# Patient Record
Sex: MALE | Race: ASIAN | NOT HISPANIC OR LATINO | URBAN - METROPOLITAN AREA
[De-identification: names, ages, dates, MRNs, and addresses within clinical notes are randomized per-mention and may not be internally consistent; named-entity substitution may affect disease eponyms.]

---

## 2017-07-01 ENCOUNTER — OUTPATIENT (OUTPATIENT)
Dept: OUTPATIENT SERVICES | Facility: HOSPITAL | Age: 67
LOS: 1 days | End: 2017-07-01
Payer: MEDICAID

## 2017-07-20 DIAGNOSIS — R69 ILLNESS, UNSPECIFIED: ICD-10-CM

## 2017-09-01 PROCEDURE — G9001: CPT

## 2022-02-02 ENCOUNTER — NON-APPOINTMENT (OUTPATIENT)
Age: 72
End: 2022-02-02

## 2022-02-02 ENCOUNTER — APPOINTMENT (OUTPATIENT)
Dept: TRANSPLANT | Facility: CLINIC | Age: 72
End: 2022-02-02
Payer: COMMERCIAL

## 2022-02-02 ENCOUNTER — APPOINTMENT (OUTPATIENT)
Dept: NEPHROLOGY | Facility: CLINIC | Age: 72
End: 2022-02-02
Payer: COMMERCIAL

## 2022-02-02 ENCOUNTER — APPOINTMENT (OUTPATIENT)
Dept: TRANSPLANT | Facility: CLINIC | Age: 72
End: 2022-02-02

## 2022-02-02 VITALS
OXYGEN SATURATION: 98 % | BODY MASS INDEX: 32.99 KG/M2 | TEMPERATURE: 97.5 F | SYSTOLIC BLOOD PRESSURE: 171 MMHG | RESPIRATION RATE: 14 BRPM | HEART RATE: 58 BPM | WEIGHT: 217 LBS | DIASTOLIC BLOOD PRESSURE: 74 MMHG

## 2022-02-02 VITALS
RESPIRATION RATE: 14 BRPM | DIASTOLIC BLOOD PRESSURE: 74 MMHG | HEART RATE: 58 BPM | OXYGEN SATURATION: 98 % | HEIGHT: 68 IN | SYSTOLIC BLOOD PRESSURE: 171 MMHG | WEIGHT: 217 LBS | BODY MASS INDEX: 32.89 KG/M2 | TEMPERATURE: 97.5 F

## 2022-02-02 DIAGNOSIS — Z83.3 FAMILY HISTORY OF DIABETES MELLITUS: ICD-10-CM

## 2022-02-02 PROCEDURE — 99072 ADDL SUPL MATRL&STAF TM PHE: CPT

## 2022-02-02 PROCEDURE — 99205 OFFICE O/P NEW HI 60 MIN: CPT

## 2022-02-02 NOTE — PHYSICAL EXAM
[Well Developed] : well developed [Well Nourished] : well nourished [Normocephalic] : normocephalic [Atraumatic] : atraumatic [Breathing Comfortably on RA] : breathing comfortably on room air

## 2022-02-02 NOTE — ASSESSMENT
[Fair candidate] : a fair candidate. We should proceed with our protocol for evaluation for kidney transplantation. [FreeTextEntry1] : Today, we discussed the risks and benefits of kidney transplantation in depth.  Surgical risks included but were not limited to bleeding, infection, graft thrombosis, ureteral and vascular strictures, delayed graft function, urine leak, and potential need for re operation postoperatively.  We also discussed the risks of postoperative immunosuppression including but not limited to increased risk of infection, cancer, weight gain, new onset or worsening of diabetes or hypertension on a temporary or permanent basis, water retention, back pain, constipation, diarrhea, dizziness, headache, joint pain, loss of appetite, nausea, stomach pain or upset, trouble sleeping, vomiting, and/or mental or mood changes were fully disclosed.  He understands these risks, and further understands that transplantation requires a life-long commitment, and is willing to proceed with kidney transplantation. He also understands that there is a risk of recurrent primary kidney disease in the transplanted organ.\par \par We discussed the differences in quality of life and patient survival between remaining on dialysis versus receiving a kidney transplant.  We also discussed increased benefits of receiving a live donor kidney transplant versus a  donor kidney transplant.\par \par We also discussed the risks and benefits of receiving a kidney from a donor with a Kidney Donor Profile Index (KDPI) score greater than 85% including a greater risk of delayed graft function, increased need for dialysis within the first 2-3 weeks after transplant, and statistically lower graft survival at 3 years. .\par Patient consent is in the chart.  Patient is aware that they may withdraw their consent for transplantation at any time.\par \par We discussed the one-year observed and expected patient and graft survival rates in comparison to the national one-year averages as described in the evaluation consent forms.\par \par Plan:\par \par Prior to consideration for transplant, the patient needs to have the following studies completed:\par \par - Labs and studies, as per transplant protocol, ordered at the time of evaluation\par - Cardiac clearance \par - Colonoscopy\par - CT A/P\par - CXR\par \par After the patient has completed the full kidney transplant evaluation and work up, he will be discussed in our Multi-disciplinary Kidney Transplant Recipient Selection Meeting. At that time we will determine the patient's suitability for kidney transplantation. \par \par \par \par

## 2022-02-02 NOTE — ASSESSMENT
[FreeTextEntry1] : .Mr. SARABIA 71 year He is evaluated for kidney transplantation.\par Pre transplant/ESRD: Patient will benefit from renal allotransplantation he is an acceptable/ moderate risk candidate, diabetes, CAD \par Medical risks: Cardiovascular, cancer screening.\par Diabetes Mellitus: Discussed implications. Continue follow up with primary physicians\par Hypertension: Discussed implications. Continue follow up with primary physicians.\par Cardiac risk:  will get further evaluation; echo, stress test; Reviewed cardiovascular risk reduction strategies\par Cancer screening: PSA .  Colon diaz screening. No known h/o neoplastic disease, chest CT (former heavy smoker)\par ID: Serology for acute and chronic viral infections. Screening for latent TB.- update\par Imaging: Renal/abdominal /chest /Iliac  imaging \par Consults: Nutrition, social work, cardiology, Transplant surgery.\par Reviewed factors affecting survival and morbidity while on wait list and reviewed doris- operative and long-term risk factors affecting outcome in kidney transplantation.\par Details of transplant surgery, immunosuppression and its complications and benefits of live donor transplantation as well as variability in wait times across regions and multiple listing were discussed. KDPI >85% and PHS high risk criteria donors were discussed. Discussed factors affecting morbidity and mortality while on hemodialysis.\par Current outcome for [facility] kidney transplant program and SRTR data were discussed. He has informative educational video and power point presentation regarding details of kidney transplantation at this center.\par Patient has potential live donor (being explored) at present. \par Will proceed with completing/ updating work up and listing for transplant/ live donor transplant once work up is reviewed and found to be ok.\par Patient will continue to follow up with Dr. Navarrete for renal function preservation.\par

## 2022-02-02 NOTE — HISTORY OF PRESENT ILLNESS
[FreeTextEntry1] : 71  years old male, born in  [facility] , living in  for 25 years\par Patient has known CKD (), preemptive.He is  on follow up with  is here for pre kidney transplant evaluation. \par He is a preemptive candidate.\par He has known DM () On insulin (age 49); HTN (). H/o Hyperlipidemia/ Gout\par H/o CAD, stent placed in 2021, Dr. Greta Coello at [facility].\par No known h/o kidney stone. He has Prostatism on Tamsulosin.\par No hematuria/Transfusions\par Nocturia: occasional\par No h/o Sleep apnea. Has h/o Thyroid disease.\par He is on Plavix. No known h/o tuberculosis or hepatitis.\par No h/o NSAID use.\par No major weight loss/weight loss surgeries\par Has h/o Pneumonia in , was hospitalized. No h/o  UTI.\par No known h/o active CVA/PVD/DVT/neoplasia/active infections/bleeding/open wounds/falls/seizures/psych issues/COVID.\par COVID vaccination: Recd vaccination\par Reports no major allergies. \par Most recent hospitalization/for:\par Past surgeries:\par No history of kidney/ bladder/ prostate surgery.\par Former smoker. Quit in 2016. 20 cigarettes/day for 57 years\par Family: \par Lives with wife, Jean-Claude Treviño. Wife is Clarence Treviño\par Parents are - Siblings- Has one brother and sister\par Children: 3 children, 2 in [place] and one in [place]. Healthy. \par No family history of kidney disease. Mother has h/o DM, Brother and sister diabetic\par Independent for ADL\par Able to walk ten blocks, can climb stairs with difficulty.\par Assist devices: cane\par Driving: No\par ROS: Has h/o shortness of breath on exertion. \par Vision:has glaucoma\par Hearing:Ok\par Functional/employment status:retired from [employer] in 2016\par Dialysis history: Preemptive\par Kidney Biopsy:None\par Potential Live donors: Daughter has expressed interest but he is not willing at present.\par \par Prior Studies:\par Cardiology:Nery Coello\par Cancer Screen:Has had colonoscopy 2015\par Consultants:Nery Coello\par Primary MD:Dr. Dick Reece\par Nephrologist:Dr. Navarrete\par Patient's brother in law is a nephrologist in [facility]- Dr. Sage\par \par Allergies: Sulfa\par \par Medications:\par \par Novolog\par Basaglar\par Drzolamide\par Amlodipine 5\par Losartan/HCTZ\par Rosuvastatin\par Tamsulosin\par Levothyroxine\par Aspirin\par Clopidogrel\par

## 2022-02-02 NOTE — HISTORY OF PRESENT ILLNESS
[de-identified] : 72yo M \par \par [id number].  On Insulin since 2000. \par \par Discovered CKD 2000.   Preemptive candidate. No access constructed. \par \par Cardiologist (Dr. Greta Coello) for SOB.  Had failed stress test, led to dx of CAD and stent 11/21 at [facility].  On ASA/Plavix.   \par \par No recent hospitalizations.  Had COVID vax.    Uses cane for balance. \par \par PMHx as above\par PSHx:   denies\par \par SocHx: lives with wife, daughter nearby in [place].  former smoker. \par FamHx: extensive family h/o DM

## 2022-02-02 NOTE — PHYSICAL EXAM
[General Appearance - Alert] : alert [General Appearance - In No Acute Distress] : in no acute distress [Sclera] : the sclera and conjunctiva were normal [PERRL With Normal Accommodation] : pupils were equal in size, round, and reactive to light [Extraocular Movements] : extraocular movements were intact [Outer Ear] : the ears and nose were normal in appearance [Oropharynx] : the oropharynx was normal [Neck Appearance] : the appearance of the neck was normal [Neck Cervical Mass (___cm)] : no neck mass was observed [Jugular Venous Distention Increased] : there was no jugular-venous distention [Thyroid Nodule] : there were no palpable thyroid nodules [Auscultation Breath Sounds / Voice Sounds] : lungs were clear to auscultation bilaterally [Heart Rate And Rhythm] : heart rate was normal and rhythm regular [Heart Sounds] : normal S1 and S2 [Heart Sounds Gallop] : no gallops [Heart Sounds Pericardial Friction Rub] : no pericardial rub [Full Pulse] : the pedal pulses are present [Bowel Sounds] : normal bowel sounds [Abdomen Soft] : soft [Abdomen Tenderness] : non-tender [Abdomen Mass (___ Cm)] : no abdominal mass palpated [Cervical Lymph Nodes Enlarged Posterior Bilaterally] : posterior cervical [Cervical Lymph Nodes Enlarged Anterior Bilaterally] : anterior cervical [Supraclavicular Lymph Nodes Enlarged Bilaterally] : supraclavicular [Axillary Lymph Nodes Enlarged Bilaterally] : axillary [Involuntary Movements] : no involuntary movements were seen [] : no rash [No Focal Deficits] : no focal deficits [Oriented To Time, Place, And Person] : oriented to person, place, and time [Impaired Insight] : insight and judgment were intact [Affect] : the affect was normal [FreeTextEntry1] : bilateral pedal edema

## 2022-02-03 ENCOUNTER — APPOINTMENT (OUTPATIENT)
Dept: TRANSPLANT | Facility: CLINIC | Age: 72
End: 2022-02-03
Payer: MEDICARE

## 2022-02-03 PROCEDURE — 81382T: CUSTOM

## 2022-02-03 PROCEDURE — 86832 HLA CLASS I HIGH DEFIN QUAL: CPT

## 2022-02-03 PROCEDURE — 99001T: CUSTOM

## 2022-02-03 PROCEDURE — 86833 HLA CLASS II HIGH DEFIN QUAL: CPT

## 2022-02-08 ENCOUNTER — NON-APPOINTMENT (OUTPATIENT)
Age: 72
End: 2022-02-08

## 2022-02-08 LAB
ABO + RH PNL BLD: NORMAL
ABO + RH PNL BLD: NORMAL
ALBUMIN SERPL ELPH-MCNC: 3.4 G/DL
ALP BLD-CCNC: 54 U/L
ALT SERPL-CCNC: 15 U/L
ANION GAP SERPL CALC-SCNC: 13 MMOL/L
APPEARANCE: CLEAR
AST SERPL-CCNC: 20 U/L
BACTERIA: NEGATIVE
BASOPHILS # BLD AUTO: 0.04 K/UL
BASOPHILS NFR BLD AUTO: 0.6 %
BILIRUB SERPL-MCNC: 0.2 MG/DL
BILIRUBIN URINE: NEGATIVE
BLOOD URINE: ABNORMAL
BUN SERPL-MCNC: 48 MG/DL
C PEPTIDE SERPL-MCNC: 1.7 NG/ML
CALCIUM SERPL-MCNC: 9.1 MG/DL
CHLORIDE SERPL-SCNC: 105 MMOL/L
CHOLEST SERPL-MCNC: 134 MG/DL
CMV DNA SPEC QL NAA+PROBE: NOT DETECTED IU/ML
CO2 SERPL-SCNC: 24 MMOL/L
COLOR: NORMAL
COVID-19 NUCLEOCAPSID  GAM ANTIBODY INTERPRETATION: NEGATIVE
COVID-19 SPIKE DOMAIN ANTIBODY INTERPRETATION: POSITIVE
CREAT SERPL-MCNC: 3.77 MG/DL
CREAT SPEC-SCNC: 68 MG/DL
CREAT/PROT UR: 8 RATIO
EBV DNA SERPL NAA+PROBE-ACNC: NOT DETECTED IU/ML
EBV EA AB SER IA-ACNC: <5 U/ML
EBV EA AB TITR SER IF: POSITIVE
EBV EA IGG SER QL IA: 240 U/ML
EBV EA IGG SER-ACNC: NEGATIVE
EBV EA IGM SER IA-ACNC: NEGATIVE
EBV PATRN SPEC IB-IMP: NORMAL
EBV VCA IGG SER IA-ACNC: 429 U/ML
EBV VCA IGM SER QL IA: <10 U/ML
EOSINOPHIL # BLD AUTO: 0.35 K/UL
EOSINOPHIL NFR BLD AUTO: 5.2 %
EPSTEIN-BARR VIRUS CAPSID ANTIGEN IGG: POSITIVE
ESTIMATED AVERAGE GLUCOSE: 157 MG/DL
GLUCOSE QUALITATIVE U: ABNORMAL
GLUCOSE SERPL-MCNC: 130 MG/DL
HAV IGM SER QL: NONREACTIVE
HBA1C MFR BLD HPLC: 7.1 %
HBV CORE IGG+IGM SER QL: NONREACTIVE
HBV SURFACE AB SER QL: NONREACTIVE
HBV SURFACE AB SERPL IA-ACNC: <3 MIU/ML
HBV SURFACE AG SER QL: NONREACTIVE
HCT VFR BLD CALC: 37.5 %
HCV AB SER QL: NONREACTIVE
HCV S/CO RATIO: 0.2 S/CO
HDLC SERPL-MCNC: 31 MG/DL
HEPATITIS A IGG ANTIBODY: REACTIVE
HGB BLD-MCNC: 11.3 G/DL
HIV1+2 AB SPEC QL IA.RAPID: NONREACTIVE
HSV 1+2 IGG SER IA-IMP: POSITIVE
HSV 1+2 IGG SER IA-IMP: POSITIVE
HSV1 IGG SER QL: >62.2 INDEX
HSV2 IGG SER QL: >23.6 INDEX
HYALINE CASTS: 3 /LPF
IMM GRANULOCYTES NFR BLD AUTO: 0.1 %
KETONES URINE: NEGATIVE
LDLC SERPL CALC-MCNC: 44 MG/DL
LEUKOCYTE ESTERASE URINE: NEGATIVE
LYMPHOCYTES # BLD AUTO: 1.86 K/UL
LYMPHOCYTES NFR BLD AUTO: 27.9 %
M TB IFN-G BLD-IMP: NEGATIVE
MAGNESIUM SERPL-MCNC: 2.3 MG/DL
MAN DIFF?: NORMAL
MCHC RBC-ENTMCNC: 26.3 PG
MCHC RBC-ENTMCNC: 30.1 GM/DL
MCV RBC AUTO: 87.4 FL
MICROSCOPIC-UA: NORMAL
MONOCYTES # BLD AUTO: 0.45 K/UL
MONOCYTES NFR BLD AUTO: 6.7 %
NEUTROPHILS # BLD AUTO: 3.96 K/UL
NEUTROPHILS NFR BLD AUTO: 59.5 %
NITRITE URINE: NEGATIVE
NONHDLC SERPL-MCNC: 102 MG/DL
PH URINE: 6
PHOSPHATE SERPL-MCNC: 5.2 MG/DL
PLATELET # BLD AUTO: 172 K/UL
POTASSIUM SERPL-SCNC: 4.1 MMOL/L
PROT SERPL-MCNC: 6.3 G/DL
PROT UR-MCNC: 545 MG/DL
PROTEIN URINE: ABNORMAL
PSA SERPL-MCNC: 2.58 NG/ML
QUANTIFERON TB PLUS MITOGEN MINUS NIL: 7.92 IU/ML
QUANTIFERON TB PLUS NIL: 0.05 IU/ML
QUANTIFERON TB PLUS TB1 MINUS NIL: -0.03 IU/ML
QUANTIFERON TB PLUS TB2 MINUS NIL: -0.01 IU/ML
RBC # BLD: 4.29 M/UL
RBC # FLD: 13.3 %
RED BLOOD CELLS URINE: 4 /HPF
RUBV IGG FLD-ACNC: 7.3 INDEX
RUBV IGG SER-IMP: POSITIVE
SARS-COV-2 AB SERPL IA-ACNC: >250 U/ML
SARS-COV-2 AB SERPL QL IA: 0.11 INDEX
SODIUM SERPL-SCNC: 142 MMOL/L
SPECIFIC GRAVITY URINE: 1.01
SQUAMOUS EPITHELIAL CELLS: 1 /HPF
T GONDII AB SER-IMP: POSITIVE
T GONDII IGG SER QL: 13.3 IU/ML
T PALLIDUM AB SER QL IA: NEGATIVE
TRIGL SERPL-MCNC: 294 MG/DL
UROBILINOGEN URINE: NORMAL
VZV AB TITR SER: POSITIVE
VZV IGG SER IF-ACNC: 656.8 INDEX
WBC # FLD AUTO: 6.67 K/UL
WHITE BLOOD CELLS URINE: 1 /HPF

## 2022-02-09 ENCOUNTER — NON-APPOINTMENT (OUTPATIENT)
Age: 72
End: 2022-02-09

## 2022-02-11 ENCOUNTER — APPOINTMENT (OUTPATIENT)
Dept: UROLOGY | Facility: CLINIC | Age: 72
End: 2022-02-11
Payer: MEDICARE

## 2022-02-11 VITALS
DIASTOLIC BLOOD PRESSURE: 78 MMHG | BODY MASS INDEX: 31.83 KG/M2 | HEIGHT: 68 IN | RESPIRATION RATE: 14 BRPM | SYSTOLIC BLOOD PRESSURE: 148 MMHG | HEART RATE: 58 BPM | WEIGHT: 210 LBS

## 2022-02-11 DIAGNOSIS — Z86.79 PERSONAL HISTORY OF OTHER DISEASES OF THE CIRCULATORY SYSTEM: ICD-10-CM

## 2022-02-11 PROCEDURE — 99203 OFFICE O/P NEW LOW 30 MIN: CPT

## 2022-02-11 NOTE — PHYSICAL EXAM
[General Appearance - Well Developed] : well developed [General Appearance - Well Nourished] : well nourished [Normal Appearance] : normal appearance [Well Groomed] : well groomed [General Appearance - In No Acute Distress] : no acute distress [Edema] : no peripheral edema [Respiration, Rhythm And Depth] : normal respiratory rhythm and effort [Exaggerated Use Of Accessory Muscles For Inspiration] : no accessory muscle use [Abdomen Soft] : soft [Abdomen Tenderness] : non-tender [Abdomen Hernia] : no hernia was discovered [Costovertebral Angle Tenderness] : no ~M costovertebral angle tenderness [Urethral Meatus] : meatus normal [Urinary Bladder Findings] : the bladder was normal on palpation [Scrotum] : the scrotum was normal [Epididymis] : the epididymides were normal [Testes Tenderness] : no tenderness of the testes [Testes Mass (___cm)] : there were no testicular masses [Prostate Enlargement] : the prostate was not enlarged [Prostate Tenderness] : the prostate was not tender [No Prostate Nodules] : no prostate nodules [Normal Station and Gait] : the gait and station were normal for the patient's age [] : no rash [No Focal Deficits] : no focal deficits [Oriented To Time, Place, And Person] : oriented to person, place, and time [Affect] : the affect was normal [Mood] : the mood was normal [Not Anxious] : not anxious

## 2022-02-11 NOTE — ASSESSMENT
[FreeTextEntry1] : Patient is a 72 yo M who presents for asymptomatic microhematuria.\par \par Discussed with pt the implications of hematuria and need to further evaluate to rule out potentially dangerous or malignant underlying etiologies.  Discussed with pt need for upper tract imaging and cystoscopy.\par He is scheduled for CT scan next week of abd/pelvis\par Repeat UA\par F/u for cystoscopy.

## 2022-02-11 NOTE — HISTORY OF PRESENT ILLNESS
[FreeTextEntry1] : Patient is a 72 yo M who presents for essentially asymptomatic microhematuria.\par \par He had routine screening for transplant evaluation.  UA showed 4 rbcs/hpf on micro.\par \par Denies dysuria or gross hematuria.  He has mild LUTS - nocturia x1.  He previously had some burning with urination and was started on flomax which he is taking and no further dysuria.\par \par No urologic hx - no kidney stones.\par No family hx of  urologic cancer.\par PSA 2.58.\par \par

## 2022-02-15 ENCOUNTER — APPOINTMENT (OUTPATIENT)
Dept: GASTROENTEROLOGY | Facility: CLINIC | Age: 72
End: 2022-02-15
Payer: MEDICARE

## 2022-02-15 VITALS — WEIGHT: 210 LBS | BODY MASS INDEX: 31.83 KG/M2 | HEIGHT: 68 IN

## 2022-02-15 DIAGNOSIS — E03.9 HYPOTHYROIDISM, UNSPECIFIED: ICD-10-CM

## 2022-02-15 DIAGNOSIS — I25.10 ATHEROSCLEROTIC HEART DISEASE OF NATIVE CORONARY ARTERY W/OUT ANGINA PECTORIS: ICD-10-CM

## 2022-02-15 DIAGNOSIS — Z86.39 PERSONAL HISTORY OF OTHER ENDOCRINE, NUTRITIONAL AND METABOLIC DISEASE: ICD-10-CM

## 2022-02-15 PROCEDURE — 99204 OFFICE O/P NEW MOD 45 MIN: CPT

## 2022-02-15 NOTE — PHYSICAL EXAM
[General Appearance - Alert] : alert [General Appearance - In No Acute Distress] : in no acute distress [Sclera] : the sclera and conjunctiva were normal [PERRL With Normal Accommodation] : pupils were equal in size, round, and reactive to light [Extraocular Movements] : extraocular movements were intact [Outer Ear] : the ears and nose were normal in appearance [Oropharynx] : the oropharynx was normal [Neck Appearance] : the appearance of the neck was normal [Neck Cervical Mass (___cm)] : no neck mass was observed [Thyroid Diffuse Enlargement] : the thyroid was not enlarged [Jugular Venous Distention Increased] : there was no jugular-venous distention [Thyroid Nodule] : there were no palpable thyroid nodules [Heart Rate And Rhythm] : heart rate was normal and rhythm regular [Heart Sounds] : normal S1 and S2 [Heart Sounds Gallop] : no gallops [Murmurs] : no murmurs [Heart Sounds Pericardial Friction Rub] : no pericardial rub [Bowel Sounds] : normal bowel sounds [Abdomen Soft] : soft [Abdomen Tenderness] : non-tender [Abdomen Mass (___ Cm)] : no abdominal mass palpated [No CVA Tenderness] : no ~M costovertebral angle tenderness [No Spinal Tenderness] : no spinal tenderness [Abnormal Walk] : normal gait [Nail Clubbing] : no clubbing  or cyanosis of the fingernails [Musculoskeletal - Swelling] : no joint swelling seen [Motor Tone] : muscle strength and tone were normal [Skin Color & Pigmentation] : normal skin color and pigmentation [Skin Turgor] : normal skin turgor [] : no rash [Deep Tendon Reflexes (DTR)] : deep tendon reflexes were 2+ and symmetric [Sensation] : the sensory exam was normal to light touch and pinprick [No Focal Deficits] : no focal deficits [Oriented To Time, Place, And Person] : oriented to person, place, and time [Impaired Insight] : insight and judgment were intact [Affect] : the affect was normal

## 2022-02-15 NOTE — HISTORY OF PRESENT ILLNESS
[FreeTextEntry1] : He has an asymptomatic 72-year-old male referred for a for a pre kidney transplant: colon evaluation.  His last colonoscopy was in 2014 which was normal.  He denies a family history of colon cancer

## 2022-02-15 NOTE — CONSULT LETTER
[Dear  ___] : Dear  [unfilled], [Consult Letter:] : I had the pleasure of evaluating your patient, [unfilled]. [( Thank you for referring [unfilled] for consultation for _____ )] : Thank you for referring [unfilled] for consultation for [unfilled] [Please see my note below.] : Please see my note below. [Consult Closing:] : Thank you very much for allowing me to participate in the care of this patient.  If you have any questions, please do not hesitate to contact me. [Sincerely,] : Sincerely, [FreeTextEntry3] : Oscar Larose MD\par \par Gastroenterology\par [facility]\par [facility]\par \par

## 2022-02-15 NOTE — ASSESSMENT
[FreeTextEntry1] : LESTER SARABIA was advised to undergo colonoscopy to which he agreed. The procedure will be performed in [facility] with the assistance of an anesthesiologist. The patient was given a Gavilyte preparation prescription and understood the \par procedure as it was explained to his. He was given a booklet distributed by the American Society of Gastrointestinal\par  Endoscopy explaining the procedure in detail and he understood the risks of the procedure not limited to infection, bleeding,\par perforation or non- diagnosis of colorectal cancer. He was advised that he could not drive home, if he chooses to\par  receive sedation.\par \par Further diagnostic and treatment recommendations will be based upon the procedure and any biopsies, if they are taken.\par \par Thank you for allowing me to participate in this patients health care.\par \par , Best personal regards -- Don\par \par He will stop Plavix 3 days before his colonoscopy but stay on\par Aspirin until and including the day  before the colonoscopy

## 2022-02-16 ENCOUNTER — OUTPATIENT (OUTPATIENT)
Dept: OUTPATIENT SERVICES | Facility: HOSPITAL | Age: 72
LOS: 1 days | End: 2022-02-16
Payer: COMMERCIAL

## 2022-02-16 ENCOUNTER — APPOINTMENT (OUTPATIENT)
Dept: CT IMAGING | Facility: IMAGING CENTER | Age: 72
End: 2022-02-16
Payer: COMMERCIAL

## 2022-02-16 DIAGNOSIS — Z01.818 ENCOUNTER FOR OTHER PREPROCEDURAL EXAMINATION: ICD-10-CM

## 2022-02-16 PROCEDURE — 74176 CT ABD & PELVIS W/O CONTRAST: CPT

## 2022-02-16 PROCEDURE — 74176 CT ABD & PELVIS W/O CONTRAST: CPT | Mod: 26

## 2022-02-16 PROCEDURE — 71250 CT THORAX DX C-: CPT

## 2022-02-16 PROCEDURE — 71250 CT THORAX DX C-: CPT | Mod: 26

## 2022-02-23 ENCOUNTER — NON-APPOINTMENT (OUTPATIENT)
Age: 72
End: 2022-02-23

## 2022-02-23 ENCOUNTER — APPOINTMENT (OUTPATIENT)
Age: 72
End: 2022-02-23

## 2022-02-23 LAB
APPEARANCE: CLEAR
BACTERIA: NEGATIVE
BILIRUBIN URINE: NEGATIVE
BLOOD URINE: NORMAL
COLOR: COLORLESS
GLUCOSE QUALITATIVE U: ABNORMAL
HYALINE CASTS: 1 /LPF
KETONES URINE: NEGATIVE
LEUKOCYTE ESTERASE URINE: NEGATIVE
MICROSCOPIC-UA: NORMAL
NITRITE URINE: NEGATIVE
PH URINE: 6
PROTEIN URINE: ABNORMAL
RED BLOOD CELLS URINE: 2 /HPF
SPECIFIC GRAVITY URINE: 1.01
SQUAMOUS EPITHELIAL CELLS: 1 /HPF
UROBILINOGEN URINE: NORMAL
WHITE BLOOD CELLS URINE: 2 /HPF

## 2022-03-08 ENCOUNTER — APPOINTMENT (OUTPATIENT)
Dept: UROLOGY | Facility: CLINIC | Age: 72
End: 2022-03-08
Payer: MEDICARE

## 2022-03-08 ENCOUNTER — OUTPATIENT (OUTPATIENT)
Dept: OUTPATIENT SERVICES | Facility: HOSPITAL | Age: 72
LOS: 1 days | End: 2022-03-08
Payer: COMMERCIAL

## 2022-03-08 DIAGNOSIS — R31.29 OTHER MICROSCOPIC HEMATURIA: ICD-10-CM

## 2022-03-08 DIAGNOSIS — N13.8 BENIGN PROSTATIC HYPERPLASIA WITH LOWER URINARY TRACT SYMPMS: ICD-10-CM

## 2022-03-08 DIAGNOSIS — N40.1 BENIGN PROSTATIC HYPERPLASIA WITH LOWER URINARY TRACT SYMPMS: ICD-10-CM

## 2022-03-08 DIAGNOSIS — R35.0 FREQUENCY OF MICTURITION: ICD-10-CM

## 2022-03-08 PROCEDURE — 52000 CYSTOURETHROSCOPY: CPT

## 2022-03-09 DIAGNOSIS — N40.1 BENIGN PROSTATIC HYPERPLASIA WITH LOWER URINARY TRACT SYMPTOMS: ICD-10-CM

## 2022-03-09 DIAGNOSIS — R31.29 OTHER MICROSCOPIC HEMATURIA: ICD-10-CM

## 2022-03-10 RX ORDER — FINASTERIDE 5 MG/1
5 TABLET, FILM COATED ORAL DAILY
Qty: 90 | Refills: 1 | Status: ACTIVE | COMMUNITY
Start: 2022-03-08 | End: 1900-01-01

## 2022-03-23 ENCOUNTER — NON-APPOINTMENT (OUTPATIENT)
Age: 72
End: 2022-03-23

## 2022-03-24 RX ORDER — ASPIRIN 325 MG/1
TABLET, FILM COATED ORAL
Refills: 0 | Status: DISCONTINUED | COMMUNITY
End: 2022-03-24

## 2022-03-24 RX ORDER — DIAZEPAM 5 MG/1
5 TABLET ORAL DAILY
Refills: 0 | Status: ACTIVE | COMMUNITY

## 2022-03-24 RX ORDER — ROSUVASTATIN CALCIUM 10 MG/1
10 TABLET, FILM COATED ORAL
Refills: 0 | Status: ACTIVE | COMMUNITY

## 2022-03-24 RX ORDER — INSULIN ASPART 100 [IU]/ML
INJECTION, SOLUTION INTRAVENOUS; SUBCUTANEOUS 3 TIMES DAILY
Refills: 0 | Status: ACTIVE | COMMUNITY

## 2022-03-24 RX ORDER — LATANOPROST/PF 0.005 %
0.01 DROPS OPHTHALMIC (EYE)
Refills: 0 | Status: ACTIVE | COMMUNITY

## 2022-03-24 RX ORDER — ISOSORBIDE MONONITRATE 30 MG/1
30 TABLET, EXTENDED RELEASE ORAL DAILY
Refills: 0 | Status: ACTIVE | COMMUNITY

## 2022-03-24 RX ORDER — ERGOCALCIFEROL 1.25 MG/1
1.25 MG CAPSULE, LIQUID FILLED ORAL
Refills: 0 | Status: ACTIVE | COMMUNITY

## 2022-03-24 RX ORDER — TAMSULOSIN HYDROCHLORIDE 0.4 MG/1
0.4 CAPSULE ORAL DAILY
Refills: 0 | Status: ACTIVE | COMMUNITY

## 2022-03-24 RX ORDER — INSULIN GLARGINE 100 [IU]/ML
INJECTION, SOLUTION SUBCUTANEOUS DAILY
Refills: 0 | Status: ACTIVE | COMMUNITY

## 2022-03-24 RX ORDER — DORZOLAMIDE HYDROCHLORIDE 20 MG/ML
SOLUTION OPHTHALMIC
Refills: 0 | Status: DISCONTINUED | COMMUNITY
End: 2022-03-24

## 2022-03-24 RX ORDER — DORZOLAMIDE HYDROCHLORIDE AND TIMOLOL MALEATE 20; 5 MG/ML; MG/ML
SOLUTION/ DROPS OPHTHALMIC
Refills: 0 | Status: ACTIVE | COMMUNITY

## 2022-03-24 RX ORDER — LOSARTAN POTASSIUM 100 MG/1
TABLET, FILM COATED ORAL
Refills: 0 | Status: DISCONTINUED | COMMUNITY
End: 2022-03-24

## 2022-03-24 RX ORDER — GABAPENTIN 100 MG/1
100 CAPSULE ORAL 3 TIMES DAILY
Refills: 0 | Status: ACTIVE | COMMUNITY

## 2022-03-31 ENCOUNTER — APPOINTMENT (OUTPATIENT)
Dept: CARDIOLOGY | Facility: CLINIC | Age: 72
End: 2022-03-31
Payer: COMMERCIAL

## 2022-03-31 ENCOUNTER — NON-APPOINTMENT (OUTPATIENT)
Age: 72
End: 2022-03-31

## 2022-03-31 VITALS
OXYGEN SATURATION: 96 % | BODY MASS INDEX: 31.83 KG/M2 | HEART RATE: 58 BPM | DIASTOLIC BLOOD PRESSURE: 75 MMHG | WEIGHT: 210 LBS | HEIGHT: 68 IN | SYSTOLIC BLOOD PRESSURE: 145 MMHG

## 2022-03-31 DIAGNOSIS — Z95.5 PRESENCE OF CORONARY ANGIOPLASTY IMPLANT AND GRAFT: ICD-10-CM

## 2022-03-31 PROCEDURE — 99072 ADDL SUPL MATRL&STAF TM PHE: CPT

## 2022-03-31 PROCEDURE — 99203 OFFICE O/P NEW LOW 30 MIN: CPT

## 2022-03-31 PROCEDURE — 93000 ELECTROCARDIOGRAM COMPLETE: CPT

## 2022-04-01 ENCOUNTER — APPOINTMENT (OUTPATIENT)
Dept: PULMONOLOGY | Facility: CLINIC | Age: 72
End: 2022-04-01
Payer: MEDICARE

## 2022-04-01 VITALS
BODY MASS INDEX: 33.04 KG/M2 | TEMPERATURE: 98.2 F | DIASTOLIC BLOOD PRESSURE: 76 MMHG | WEIGHT: 218 LBS | HEART RATE: 57 BPM | OXYGEN SATURATION: 96 % | HEIGHT: 68 IN | SYSTOLIC BLOOD PRESSURE: 140 MMHG

## 2022-04-01 DIAGNOSIS — Z87.891 PERSONAL HISTORY OF NICOTINE DEPENDENCE: ICD-10-CM

## 2022-04-01 DIAGNOSIS — R06.00 DYSPNEA, UNSPECIFIED: ICD-10-CM

## 2022-04-01 DIAGNOSIS — R91.8 OTHER NONSPECIFIC ABNORMAL FINDING OF LUNG FIELD: ICD-10-CM

## 2022-04-01 PROCEDURE — 94726 PLETHYSMOGRAPHY LUNG VOLUMES: CPT

## 2022-04-01 PROCEDURE — 94010 BREATHING CAPACITY TEST: CPT

## 2022-04-01 PROCEDURE — G0296 VISIT TO DETERM LDCT ELIG: CPT

## 2022-04-01 PROCEDURE — ZZZZZ: CPT

## 2022-04-01 PROCEDURE — 99204 OFFICE O/P NEW MOD 45 MIN: CPT | Mod: 25

## 2022-04-01 PROCEDURE — 94729 DIFFUSING CAPACITY: CPT

## 2022-04-01 RX ORDER — LOSARTAN POTASSIUM AND HYDROCHLOROTHIAZIDE 25; 100 MG/1; MG/1
100-25 TABLET ORAL DAILY
Refills: 0 | Status: COMPLETED | COMMUNITY
End: 2022-04-01

## 2022-04-01 RX ORDER — UMECLIDINIUM BROMIDE AND VILANTEROL TRIFENATATE 62.5; 25 UG/1; UG/1
62.5-25 POWDER RESPIRATORY (INHALATION)
Qty: 2 | Refills: 3 | Status: ACTIVE | COMMUNITY
Start: 2022-04-01 | End: 1900-01-01

## 2022-04-01 RX ORDER — HYDRALAZINE HYDROCHLORIDE 25 MG/1
25 TABLET ORAL
Refills: 0 | Status: ACTIVE | COMMUNITY

## 2022-04-01 RX ORDER — AMLODIPINE BESYLATE 10 MG/1
10 TABLET ORAL DAILY
Refills: 0 | Status: COMPLETED | COMMUNITY
End: 2022-04-01

## 2022-04-01 NOTE — PHYSICAL EXAM
[No Acute Distress] : no acute distress [Normal Oropharynx] : normal oropharynx [Normal Appearance] : normal appearance [No Neck Mass] : no neck mass [Normal Rate/Rhythm] : normal rate/rhythm [Normal S1, S2] : normal s1, s2 [No Murmurs] : no murmurs [No Resp Distress] : no resp distress [Clear to Auscultation Bilaterally] : clear to auscultation bilaterally [No Abnormalities] : no abnormalities [Benign] : benign [Normal Gait] : normal gait [No Clubbing] : no clubbing [No Cyanosis] : no cyanosis [FROM] : FROM [Normal Color/ Pigmentation] : normal color/ pigmentation [No Focal Deficits] : no focal deficits [Oriented x3] : oriented x3 [Normal Affect] : normal affect [Well Nourished] : well nourished [Well Developed] : well developed [Supple] : supple [No JVD] : no jvd [Normal Pulses] : normal pulses [No Acc Muscle Use] : no acc muscle use [Not Tender] : not tender [Soft] : soft [Cranial Nerves Intact] : cranial nerves intact [No Motor Deficits] : no motor deficits [TextBox_2] : central obesity [TextBox_11] : crowded [TextBox_54] : 2+pitting edema bilaterally [TextBox_68] : no wheezing or rales

## 2022-04-01 NOTE — HISTORY OF PRESENT ILLNESS
[Former] : former [Never] : never [TextBox_4] : Mr. Landrum is a 71 year-old male with a history of CKD pending renal transplant, DM2 (insulin-dependent), HTN, HLD, CAD s/p PCI (Nov 2021), former heavy smoker, BPH, and gout who presents for evaluation of lung nodules identified on CT chest. \par \par CT Chest (Feb 2022) with few calcified 1-2 mm nodules and non-calcified nodules, largest measuring 5 mm in the RLL. Mild biapical pleural scarring. No pleural effusion or thoracic lymphadenopathy.\par \par He is a former smoker, 1 ppd for 50-55 years. He denies any dyspnea at rest or with regular exertion. He reports that exercise is limited due to knee pain, but also reports some dyspnea with activity. He has no chest pain, wheezing, or significant coughing. He reports leg edema that has improved after stopping amlodipine.\par \par QuantiFeron TB Gold Plus negative in February 2022.\par \par He is pending echo.\par \par PFTs (April 2022) difficult to interpret as he had difficulty with testing, but there is nonspecific pattern of reduced FEV1 & FVC with normal FEV1/FVC ratio, normal TLC, increased RV due to air trapping, and mildly reduced diffusing capacity. This can be seen in obesity and obstructive lung disease, among other conditions.\par \par Of note he has a history of pneumonia in 2005 that required hospitalization. \par \par Up to date with influenza and COVID-19 vaccinations. Reports history of pneumococcal vaccination about 5 years ago.

## 2022-04-01 NOTE — CONSULT LETTER
[Dear  ___] : Dear  [unfilled], [Consult Letter:] : I had the pleasure of evaluating your patient, [unfilled]. [( Thank you for referring [unfilled] for consultation for _____ )] : Thank you for referring [unfilled] for consultation for [unfilled] [Please see my note below.] : Please see my note below. [Consult Closing:] : Thank you very much for allowing me to participate in the care of this patient.  If you have any questions, please do not hesitate to contact me. [Sincerely,] : Sincerely, [DrAlvino  ___] : Dr. KITCHEN [FreeTextEntry2] : Dr. Kristian Wheeler MD\par Fax: [phone]\par \par Dr. Amado Ortiz MD\par Fax: [phone] [FreeTextEntry3] : Jose Carlos Solis MD\par Attending Physician in Pulmonary & Critical Care Medicine\par  of Medicine\par [employer] [facility]

## 2022-04-01 NOTE — ASSESSMENT
[FreeTextEntry1] : Mr. Landrum is a 71 year-old male with a history of CKD pending renal transplant, DM2 (insulin-dependent), HTN, HLD, CAD s/p PCI (Nov 2021), former heavy smoker, BPH, and gout who presents for pre-kidney transplant pulmonary evaluation. He was found on CT chest to have multiple subcentimeter lung nodules (largest 5 mm) with mild biapical pleural scarring and without pleural effusion or thoracic lymphadenopathy. He is a former heavy smoker. Denies any dyspnea at rest or with regular exertion, but he report dyspnea with exercise such as going up a flight of stairs. Exercise is also limited by his knee pain. He denies chest pain, wheezing, or significant coughing. He has leg edema that has improved after stopping amlodipine.\par \par 1. Multiple subcentimeter lung nodules:\par - Per the Fleischner Society guidelines, he should receive a repeat CT chest in 1 year to assess for stability, which he will require anyway for lung cancer screening\par - Does not require invasive diagnostic testing for the lung nodules at this time\par \par 2. Former smoker:\par - Former >50-pack-year smoker, quit in 2016\par - Continue smoking cessation\par - Needs yearly lung cancer screening CT chest until at least 2031, next due in Feb 2023\par - He participated in a shared-decision making session where benefits, limitations, and potential harms were discussed. He is eligible based on age, calculation of cigarette-smoking pack years, and years since quitting. The patient was informed of the importance of adherence to annual screening and the impact of comorbidities. He is asymptomatic (no signs or symptoms of lung cancer)\par \par 3. Dyspnea on exertion:\par - Likely multifactorial due to underlying hypertensive heart disease + CAD + deconditioning + former smoker\par - PFTs (April 2022) difficult to interpret as he had difficulty with testing, but there is nonspecific pattern of reduced FEV1 & FVC with normal FEV1/FVC ratio, normal TLC, increased RV due to air trapping, and mildly reduced diffusing capacity. This can be seen in obesity and obstructive lung disease, among other conditions\par - He is pending echocardiogram next week\par - Will initiate trial of Anoro Ellipta for possible underlying COPD that is contributing to his SIERRA\par \par 4. Pre-Kidney transplant evaluation:\par - QuantiFeron TB Gold Plus negative in February 2022\par - There is no contraindication from a pulmonary perspective to proceed with kidney transplant. Given his history of smoking with possible obstructive lung disease along with history of CAD and long-standing hypertension, he is at higher risk for post-op respiratory failure, although he is currently without hypoxemia and only has dyspnea with significant exertion. He should take his Anoro Ellipta on the morning of surgery. Can be administered Duonebs if develops wheezing or dyspnea. Please limit sedation to lowest dose required. Needs close cardiac and respiratory monitoring during and after procedure. Consider CPAP therapy post-procedure if he develops respiratory distress. No contraindications to proceeding with procedure from pulmonary perspective with above precautions \par \par 5. HCM:\par - Up to date with influenza and COVID-19 vaccinations\par - Reports history of pneumococcal vaccination about 5 years ago\par - Follow-up in 3-6 months or sooner prn

## 2022-04-20 ENCOUNTER — APPOINTMENT (OUTPATIENT)
Dept: CV DIAGNOSTICS | Facility: HOSPITAL | Age: 72
End: 2022-04-20
Payer: COMMERCIAL

## 2022-04-20 ENCOUNTER — OUTPATIENT (OUTPATIENT)
Dept: OUTPATIENT SERVICES | Facility: HOSPITAL | Age: 72
LOS: 1 days | End: 2022-04-20

## 2022-04-20 ENCOUNTER — APPOINTMENT (OUTPATIENT)
Dept: CV DIAGNOSITCS | Facility: HOSPITAL | Age: 72
End: 2022-04-20
Payer: COMMERCIAL

## 2022-04-20 DIAGNOSIS — I10 ESSENTIAL (PRIMARY) HYPERTENSION: ICD-10-CM

## 2022-04-20 DIAGNOSIS — Z01.818 ENCOUNTER FOR OTHER PREPROCEDURAL EXAMINATION: ICD-10-CM

## 2022-04-20 PROCEDURE — 93016 CV STRESS TEST SUPVJ ONLY: CPT | Mod: GC

## 2022-04-20 PROCEDURE — 93306 TTE W/DOPPLER COMPLETE: CPT | Mod: 26

## 2022-04-20 PROCEDURE — 93018 CV STRESS TEST I&R ONLY: CPT | Mod: GC

## 2022-04-20 PROCEDURE — 78452 HT MUSCLE IMAGE SPECT MULT: CPT | Mod: 26,MH

## 2022-04-21 NOTE — HISTORY OF PRESENT ILLNESS
[FreeTextEntry1] : Doing okay. Denies chest pain, shortness of breath or palpitations. Has some leg swelling.

## 2022-04-21 NOTE — PHYSICAL EXAM
[Well Developed] : well developed [No Acute Distress] : no acute distress [Well Nourished] : well nourished [Normal Conjunctiva] : normal conjunctiva [Normal Venous Pressure] : normal venous pressure [No Carotid Bruit] : no carotid bruit [Normal S1, S2] : normal S1, S2 [No Murmur] : no murmur [No Rub] : no rub [No Gallop] : no gallop [Good Air Entry] : good air entry [No Respiratory Distress] : no respiratory distress  [Soft] : abdomen soft [Non Tender] : non-tender [Normal Gait] : normal gait [No Cyanosis] : no cyanosis [No Rash] : no rash [No Skin Lesions] : no skin lesions [Moves all extremities] : moves all extremities [No Focal Deficits] : no focal deficits [Normal Speech] : normal speech [Alert and Oriented] : alert and oriented [de-identified] : bilateral LE 1+ pitting edema [de-identified] : inspiratory rales in left base

## 2022-04-21 NOTE — ADDENDUM
[FreeTextEntry1] : CARDIAC TESTING:\par 04/20/22 (regadenoson tetrofosmin) - no evidence of infarction or inducible ischemia, LVEF 70%\par 04/20/22 ECHO - TDS, MAC, normal LA, grossly normal LV systolic function, LVEF 65%\par \par ASSESSMENT/PLAN:\par At this time, patient is considered an acceptable risk from a cardiac standpoint. Follow up annually pre-transplant.

## 2022-04-21 NOTE — DISCUSSION/SUMMARY
[Coronary Artery Disease] : coronary artery disease [Stable] : stable [Hypertension] : hypertension [FreeTextEntry1] : \par Currently stable from a cardiovascular standpoint. Hypertensive today. Currently in mild volume overload. Stable CAD (prior stent at [facility]). No ischemic or CHF symptoms. Continue current medications. ECG completed today and reviewed. Will obtain records from [place] for review. Will schedule a pharmacologic nuclear stress test to rule out any significant CAD/ischemia. In addition, will schedule an echocardiogram to assess his cardiac structures and function. Pending the test results, I will make further recommendations.

## 2022-04-27 ENCOUNTER — APPOINTMENT (OUTPATIENT)
Dept: VASCULAR SURGERY | Facility: CLINIC | Age: 72
End: 2022-04-27
Payer: MEDICARE

## 2022-04-27 ENCOUNTER — NON-APPOINTMENT (OUTPATIENT)
Age: 72
End: 2022-04-27

## 2022-04-27 VITALS
SYSTOLIC BLOOD PRESSURE: 155 MMHG | HEART RATE: 68 BPM | TEMPERATURE: 97.2 F | DIASTOLIC BLOOD PRESSURE: 77 MMHG | BODY MASS INDEX: 34.1 KG/M2 | HEIGHT: 68 IN | WEIGHT: 225 LBS

## 2022-04-27 DIAGNOSIS — Z87.448 PERSONAL HISTORY OF OTHER DISEASES OF URINARY SYSTEM: ICD-10-CM

## 2022-04-27 PROCEDURE — 99204 OFFICE O/P NEW MOD 45 MIN: CPT

## 2022-04-27 PROCEDURE — 93985 DUP-SCAN HEMO COMPL BI STD: CPT

## 2022-04-27 RX ORDER — ASPIRIN 81 MG/1
81 TABLET ORAL
Refills: 0 | Status: ACTIVE | COMMUNITY

## 2022-04-27 NOTE — ASSESSMENT
[FreeTextEntry1] : Problem #1 Long term dialysis access creation\par - will schedule for LUE AVF creation\par \par Problem #2 CKD Stage V\par - Management as per nephrology

## 2022-04-27 NOTE — HISTORY OF PRESENT ILLNESS
[FreeTextEntry1] : 71 year old male with CKD Stage V referred for long term hemodialysis access creation\par \par Nephrologist: Dr. Ortiz\par \par Right hand dominant. No pacemaker or AICD.

## 2022-04-27 NOTE — PHYSICAL EXAM
[Respiratory Effort] : normal respiratory effort [Normal Rate and Rhythm] : normal rate and rhythm [2+] : left 2+ [Alert] : alert [Oriented to Person] : oriented to person [Oriented to Place] : oriented to place [Oriented to Time] : oriented to time [Calm] : calm [Ankle Swelling (On Exam)] : not present [Varicose Veins Of Lower Extremities] : not present [] : not present [Abdomen Tenderness] : ~T ~M No abdominal tenderness [Skin Ulcer] : no ulcer [de-identified] : appears stated age [de-identified] : normocephalic, atraumatic [de-identified] : supple

## 2022-05-09 ENCOUNTER — RESULT REVIEW (OUTPATIENT)
Age: 72
End: 2022-05-09

## 2022-05-10 ENCOUNTER — APPOINTMENT (OUTPATIENT)
Dept: GASTROENTEROLOGY | Facility: AMBULATORY SURGERY CENTER | Age: 72
End: 2022-05-10
Payer: MEDICARE

## 2022-05-10 DIAGNOSIS — Z12.11 ENCOUNTER FOR SCREENING FOR MALIGNANT NEOPLASM OF COLON: ICD-10-CM

## 2022-05-10 PROCEDURE — 45385 COLONOSCOPY W/LESION REMOVAL: CPT

## 2022-05-10 PROCEDURE — 45380 COLONOSCOPY AND BIOPSY: CPT | Mod: 59

## 2022-05-19 ENCOUNTER — OUTPATIENT (OUTPATIENT)
Dept: OUTPATIENT SERVICES | Facility: HOSPITAL | Age: 72
LOS: 1 days | End: 2022-05-19
Payer: COMMERCIAL

## 2022-05-19 VITALS
OXYGEN SATURATION: 96 % | DIASTOLIC BLOOD PRESSURE: 71 MMHG | SYSTOLIC BLOOD PRESSURE: 147 MMHG | RESPIRATION RATE: 18 BRPM | HEART RATE: 63 BPM | HEIGHT: 67.5 IN | TEMPERATURE: 98 F | WEIGHT: 203.93 LBS

## 2022-05-19 DIAGNOSIS — Z98.890 OTHER SPECIFIED POSTPROCEDURAL STATES: Chronic | ICD-10-CM

## 2022-05-19 DIAGNOSIS — N18.5 CHRONIC KIDNEY DISEASE, STAGE 5: ICD-10-CM

## 2022-05-19 DIAGNOSIS — Z01.818 ENCOUNTER FOR OTHER PREPROCEDURAL EXAMINATION: ICD-10-CM

## 2022-05-19 DIAGNOSIS — I10 ESSENTIAL (PRIMARY) HYPERTENSION: ICD-10-CM

## 2022-05-19 DIAGNOSIS — E11.9 TYPE 2 DIABETES MELLITUS WITHOUT COMPLICATIONS: ICD-10-CM

## 2022-05-19 DIAGNOSIS — G47.33 OBSTRUCTIVE SLEEP APNEA (ADULT) (PEDIATRIC): ICD-10-CM

## 2022-05-19 DIAGNOSIS — N18.6 END STAGE RENAL DISEASE: ICD-10-CM

## 2022-05-19 DIAGNOSIS — Z99.2 DEPENDENCE ON RENAL DIALYSIS: ICD-10-CM

## 2022-05-19 LAB
ANION GAP SERPL CALC-SCNC: 16 MMOL/L — SIGNIFICANT CHANGE UP (ref 5–17)
BUN SERPL-MCNC: 69 MG/DL — HIGH (ref 7–23)
CALCIUM SERPL-MCNC: 10.9 MG/DL — HIGH (ref 8.4–10.5)
CHLORIDE SERPL-SCNC: 95 MMOL/L — LOW (ref 96–108)
CO2 SERPL-SCNC: 26 MMOL/L — SIGNIFICANT CHANGE UP (ref 22–31)
CREAT SERPL-MCNC: 4 MG/DL — HIGH (ref 0.5–1.3)
EGFR: 15 ML/MIN/1.73M2 — LOW
GLUCOSE SERPL-MCNC: 371 MG/DL — HIGH (ref 70–99)
HCT VFR BLD CALC: 35.8 % — LOW (ref 39–50)
HGB BLD-MCNC: 11.3 G/DL — LOW (ref 13–17)
MCHC RBC-ENTMCNC: 26.9 PG — LOW (ref 27–34)
MCHC RBC-ENTMCNC: 31.6 GM/DL — LOW (ref 32–36)
MCV RBC AUTO: 85.2 FL — SIGNIFICANT CHANGE UP (ref 80–100)
NRBC # BLD: 0 /100 WBCS — SIGNIFICANT CHANGE UP (ref 0–0)
PLATELET # BLD AUTO: 158 K/UL — SIGNIFICANT CHANGE UP (ref 150–400)
POTASSIUM SERPL-MCNC: 4 MMOL/L — SIGNIFICANT CHANGE UP (ref 3.5–5.3)
POTASSIUM SERPL-SCNC: 4 MMOL/L — SIGNIFICANT CHANGE UP (ref 3.5–5.3)
RBC # BLD: 4.2 M/UL — SIGNIFICANT CHANGE UP (ref 4.2–5.8)
RBC # FLD: 12.6 % — SIGNIFICANT CHANGE UP (ref 10.3–14.5)
SODIUM SERPL-SCNC: 137 MMOL/L — SIGNIFICANT CHANGE UP (ref 135–145)
WBC # BLD: 6.88 K/UL — SIGNIFICANT CHANGE UP (ref 3.8–10.5)
WBC # FLD AUTO: 6.88 K/UL — SIGNIFICANT CHANGE UP (ref 3.8–10.5)

## 2022-05-19 PROCEDURE — 36415 COLL VENOUS BLD VENIPUNCTURE: CPT

## 2022-05-19 PROCEDURE — 83036 HEMOGLOBIN GLYCOSYLATED A1C: CPT

## 2022-05-19 PROCEDURE — G0463: CPT

## 2022-05-19 PROCEDURE — 85027 COMPLETE CBC AUTOMATED: CPT

## 2022-05-19 PROCEDURE — 80048 BASIC METABOLIC PNL TOTAL CA: CPT

## 2022-05-19 NOTE — H&P PST ADULT - NSICDXPASTMEDICALHX_GEN_ALL_CORE_FT
PAST MEDICAL HISTORY:  CAD (coronary artery disease) stent 2021    Gout     History of BPH     History of tobacco abuse     HTN (hypertension)     Hyperlipidemia     Lung nodules followed by Dr. Solis (pulm)    Type 2 diabetes mellitus      PAST MEDICAL HISTORY:  CAD (coronary artery disease) stent 10/2021    Gout     History of BPH     History of tobacco abuse     HTN (hypertension)     Hyperlipidemia     Lung nodules followed by Dr. Solis (pulm)    Stage 5 chronic kidney disease not on chronic dialysis     Type 2 diabetes mellitus      PAST MEDICAL HISTORY:  CAD (coronary artery disease) stent 10/2021    Gout     History of BPH     History of glaucoma     History of tobacco abuse     HTN (hypertension)     Hyperlipidemia     Lung nodules followed by Dr. Solis (pulm)    Stage 5 chronic kidney disease not on chronic dialysis     Type 2 diabetes mellitus

## 2022-05-19 NOTE — H&P PST ADULT - HISTORY OF PRESENT ILLNESS
71 year old male, with a history of CKD Stage V, presents for left AV fistula. Pt. with a history of progressively worsening CKD, currently being evaluated for renal transplant. No hemodialysis required as of yet.    Pre procedure COVID swabs scheduled for 5/30/22.

## 2022-05-19 NOTE — H&P PST ADULT - NSANTHOSAYNRD_GEN_A_CORE
No. LUCIUS screening performed.  STOP BANG Legend: 0-2 = LOW Risk; 3-4 = INTERMEDIATE Risk; 5-8 = HIGH Risk

## 2022-05-19 NOTE — H&P PST ADULT - NSICDXPASTSURGICALHX_GEN_ALL_CORE_FT
PAST SURGICAL HISTORY:  No significant past surgical history      PAST SURGICAL HISTORY:  History of colonoscopy

## 2022-05-19 NOTE — H&P PST ADULT - NS MD HP PULSE POSTERIOR
Pt seen and examined. Agree with above. Minimally displaced calvarial fracture with no ICH. baby at neurobaseline. Age appropriate behaviour and alertness. Per mom baby has been eating well, playful, active, reg bowel/bladder function. No neurosurgical intervention. Patient will f/u in office in 1 week. pt to return to ER for any concerns. right normal/left normal

## 2022-05-19 NOTE — H&P PST ADULT - PROBLEM SELECTOR PLAN 3
Stat fingerstick upon arrival to [facility]  As per PST policy, pt instructed to decrease his evening dose of basaglar insulin by 20% (Self administer 40 units night before procedure)  Pt. instructed NOT to take novolog insulin day of procedure

## 2022-05-20 ENCOUNTER — NON-APPOINTMENT (OUTPATIENT)
Age: 72
End: 2022-05-20

## 2022-05-20 LAB
A1C WITH ESTIMATED AVERAGE GLUCOSE RESULT: 8.7 % — HIGH (ref 4–5.6)
ESTIMATED AVERAGE GLUCOSE: 203 MG/DL — HIGH (ref 68–114)

## 2022-05-24 ENCOUNTER — APPOINTMENT (OUTPATIENT)
Dept: ENDOCRINOLOGY | Facility: CLINIC | Age: 72
End: 2022-05-24
Payer: MEDICARE

## 2022-05-24 VITALS
BODY MASS INDEX: 30.66 KG/M2 | HEART RATE: 57 BPM | DIASTOLIC BLOOD PRESSURE: 83 MMHG | SYSTOLIC BLOOD PRESSURE: 142 MMHG | OXYGEN SATURATION: 97 % | TEMPERATURE: 97.9 F | WEIGHT: 202.31 LBS | HEIGHT: 68 IN

## 2022-05-24 PROBLEM — E78.5 HYPERLIPIDEMIA, UNSPECIFIED: Chronic | Status: ACTIVE | Noted: 2022-05-19

## 2022-05-24 PROBLEM — N18.5 CHRONIC KIDNEY DISEASE, STAGE 5: Chronic | Status: ACTIVE | Noted: 2022-05-19

## 2022-05-24 PROBLEM — I25.10 ATHEROSCLEROTIC HEART DISEASE OF NATIVE CORONARY ARTERY WITHOUT ANGINA PECTORIS: Chronic | Status: ACTIVE | Noted: 2022-05-19

## 2022-05-24 PROBLEM — M10.9 GOUT, UNSPECIFIED: Chronic | Status: ACTIVE | Noted: 2022-05-19

## 2022-05-24 PROBLEM — Z87.438 PERSONAL HISTORY OF OTHER DISEASES OF MALE GENITAL ORGANS: Chronic | Status: ACTIVE | Noted: 2022-05-19

## 2022-05-24 PROBLEM — R91.8 OTHER NONSPECIFIC ABNORMAL FINDING OF LUNG FIELD: Chronic | Status: ACTIVE | Noted: 2022-05-19

## 2022-05-24 PROBLEM — Z87.891 PERSONAL HISTORY OF NICOTINE DEPENDENCE: Chronic | Status: ACTIVE | Noted: 2022-05-19

## 2022-05-24 PROBLEM — E11.9 TYPE 2 DIABETES MELLITUS WITHOUT COMPLICATIONS: Chronic | Status: ACTIVE | Noted: 2022-05-19

## 2022-05-24 LAB
GLUCOSE BLDC GLUCOMTR-MCNC: 209
HBA1C MFR BLD HPLC: 9.1

## 2022-05-24 PROCEDURE — 83036 HEMOGLOBIN GLYCOSYLATED A1C: CPT | Mod: QW

## 2022-05-24 PROCEDURE — 95251 CONT GLUC MNTR ANALYSIS I&R: CPT

## 2022-05-24 PROCEDURE — 99204 OFFICE O/P NEW MOD 45 MIN: CPT | Mod: 25

## 2022-05-25 LAB
T3FREE SERPL-MCNC: 2.28 PG/ML
T4 FREE SERPL-MCNC: 1 NG/DL
TSH SERPL-ACNC: 13.3 UIU/ML

## 2022-05-25 RX ORDER — INSULIN ASPART 100 [IU]/ML
100 INJECTION, SOLUTION INTRAVENOUS; SUBCUTANEOUS
Qty: 3 | Refills: 0 | Status: ACTIVE | COMMUNITY
Start: 2022-05-25 | End: 1900-01-01

## 2022-05-25 RX ORDER — INSULIN GLARGINE 100 [IU]/ML
100 INJECTION, SOLUTION SUBCUTANEOUS
Qty: 1 | Refills: 2 | Status: ACTIVE | COMMUNITY
Start: 2022-05-25 | End: 1900-01-01

## 2022-05-28 NOTE — HISTORY OF PRESENT ILLNESS
[FreeTextEntry1] : Mr. LESTER SARABIA is a 71 year old male who presents for initial endocrine evaluation with regard to a hx of type 2 dm. The diabetes has been present for about 32  years. does note hx of retinopathy- is getting injections in the right eye. Does have CKD with gfr around 15.  Has had Pre kidney transplant evaluation. With regard to neuropathy ,he denies any neurologic s/s. For the diabetes, he has been on insulin since age 49. He is currently taking  Basaglar   50 units hs   and Novolog insulin  15 units pre breakfast, 20 units pre lunch, 15 units pre dinner.  He denies polyuria, polydipsia, or any visual changes. He  too denies any skin lesions, skin breakdown or non-healing areas of skin. He  too denies any podiatric concerns. Ophthalmologic evaluation is up to date. Home glucose monitoring has shown values to be running mid 100s during the day,  up to 280s at night. No spikes after meals. Was previously experiencing hypoglycemic episodes, but when he lowered insulin glucose levels became uncontrolled. Returned to past dose but levels are still high. \par \par POCT A1c returned today at 9.1%  ; previously 7.1% from 2/2/2022\par POCT glucose returned today at  209  mg/dL \par \par FHx: mother, brother, and son with diabetes\par Additional medical history includes that of Cad, .htn. CKD as noted, BPH, and hypothyroidism. \par Had coronary stent placed 2021 at [facility]\par \par Meds: Gabapentin 100 mg 2 tabs daily, LT4 50 mcg daily, Finasteride 5 mg, HCTZ 50 mg, ASA 81 mg, Rosuvastatin 10 mg, Furosemide 80 mg, Clopidogrel 75 mg, Tamsulosin, Osteo shell calcium \par \par Was supposed to have fistula procedure in June but was cancelled during preop testing because blood glucose at the time was 300. \par \par PMD  Dr. Dick Reece\par Cardiologist: Dr. Sarwat Coello.\par Nephrologist: Dr. Ortiz in [place]\par \par SOC:   Former cigarette smoker,  No signif etoh\par

## 2022-05-31 ENCOUNTER — NON-APPOINTMENT (OUTPATIENT)
Age: 72
End: 2022-05-31

## 2022-06-02 ENCOUNTER — APPOINTMENT (OUTPATIENT)
Dept: VASCULAR SURGERY | Facility: HOSPITAL | Age: 72
End: 2022-06-02

## 2022-06-09 ENCOUNTER — APPOINTMENT (OUTPATIENT)
Dept: TRANSPLANT | Facility: CLINIC | Age: 72
End: 2022-06-09

## 2022-06-09 ENCOUNTER — NON-APPOINTMENT (OUTPATIENT)
Age: 72
End: 2022-06-09

## 2022-06-10 LAB
CMV IGG SERPL QL: >10 U/ML
CMV IGG SERPL-IMP: POSITIVE

## 2022-06-14 ENCOUNTER — APPOINTMENT (OUTPATIENT)
Dept: ENDOCRINOLOGY | Facility: CLINIC | Age: 72
End: 2022-06-14
Payer: MEDICARE

## 2022-06-14 VITALS
RESPIRATION RATE: 15 BRPM | OXYGEN SATURATION: 96 % | SYSTOLIC BLOOD PRESSURE: 138 MMHG | BODY MASS INDEX: 30.56 KG/M2 | DIASTOLIC BLOOD PRESSURE: 72 MMHG | HEART RATE: 61 BPM | TEMPERATURE: 98.6 F | WEIGHT: 201 LBS

## 2022-06-14 DIAGNOSIS — E03.9 HYPOTHYROIDISM, UNSPECIFIED: ICD-10-CM

## 2022-06-14 DIAGNOSIS — E11.9 TYPE 2 DIABETES MELLITUS W/OUT COMPLICATIONS: ICD-10-CM

## 2022-06-14 PROCEDURE — 99214 OFFICE O/P EST MOD 30 MIN: CPT | Mod: 25

## 2022-06-14 PROCEDURE — 95251 CONT GLUC MNTR ANALYSIS I&R: CPT

## 2022-06-16 ENCOUNTER — NON-APPOINTMENT (OUTPATIENT)
Age: 72
End: 2022-06-16

## 2022-06-16 LAB
T3FREE SERPL-MCNC: 2.12 PG/ML
T4 FREE SERPL-MCNC: 1.1 NG/DL
THYROGLOB AB SERPL-ACNC: <20 IU/ML
THYROPEROXIDASE AB SERPL IA-ACNC: 36.5 IU/ML
TSH SERPL-ACNC: 6.25 UIU/ML

## 2022-06-16 RX ORDER — LEVOTHYROXINE SODIUM 0.09 MG/1
88 TABLET ORAL DAILY
Qty: 90 | Refills: 1 | Status: ACTIVE | COMMUNITY
Start: 2022-05-25 | End: 1900-01-01

## 2022-06-17 PROBLEM — E11.9 DIABETES MELLITUS: Status: ACTIVE | Noted: 2022-02-11

## 2022-06-17 NOTE — HISTORY OF PRESENT ILLNESS
[FreeTextEntry1] : Mr. LESTER SARABIA is a 71 year old male who returns with regard to a hx of type 2 dm. The diabetes has been present for about 32  years. does note hx of retinopathy- is getting injections in the right eye. Does have CKD with gfr around 15.  Has had Pre kidney transplant evaluation. With regard to neuropathy ,he denies any neurologic s/s.  He is pending HD fistula placement.\par \par For the diabetes, he has been on insulin since age 49. He is currently taking  Basaglar 15 units am and 35 units hs   and Novolog insulin  15 units pre breakfast, 20 units pre lunch, 15 units pre dinner.  He denies polyuria, polydipsia, or any visual changes. He  too denies any skin lesions, skin breakdown or non-healing areas of skin. He  too denies any podiatric concerns. Ophthalmologic evaluation is up to date\par \par Home glucose monitoring has shown values to be running low to mid 100s with occasional values after meals up to 200s. Much improved since lst visit.\par Lunch is at 1-2 pm. \par \par \par 5/24/2022 A1c returned at 9.1 %.\par 5/24/2022 TSH was 13.30 (Synthroid was increased from 50 mcg to 75 mcg.)\par \par \par Additional medical history includes that of Cad, .htn. CKD as noted, BPH, and hypothyroidism. \par Had coronary stent placed 2021 at [facility]\par \par Meds: Gabapentin 100 mg 2 tabs daily,, Finasteride 5 mg, HCTZ 50 mg, ASA 81 mg, Rosuvastatin 10 mg, Furosemide 80 mg, Clopidogrel 75 mg, Tamsulosin, Osteo shell calcium \par \par Was supposed to have fistula procedure for future access for hemodialysis in June but was cancelled during preop testing because blood glucose at the time was 300. No scheduled date at this time. \par \par PMD  Dr. Dick Reece\par Cardiologist: Dr. Sarwat Coello.\par Nephrologist: Dr. Ortiz in [place]\par \par \par

## 2022-06-27 ENCOUNTER — OUTPATIENT (OUTPATIENT)
Dept: OUTPATIENT SERVICES | Facility: HOSPITAL | Age: 72
LOS: 1 days | End: 2022-06-27
Payer: COMMERCIAL

## 2022-06-27 VITALS
TEMPERATURE: 98 F | DIASTOLIC BLOOD PRESSURE: 80 MMHG | WEIGHT: 199.96 LBS | HEIGHT: 68 IN | HEART RATE: 61 BPM | RESPIRATION RATE: 16 BRPM | SYSTOLIC BLOOD PRESSURE: 144 MMHG | OXYGEN SATURATION: 97 %

## 2022-06-27 DIAGNOSIS — N18.6 END STAGE RENAL DISEASE: ICD-10-CM

## 2022-06-27 DIAGNOSIS — Z01.818 ENCOUNTER FOR OTHER PREPROCEDURAL EXAMINATION: ICD-10-CM

## 2022-06-27 DIAGNOSIS — Z99.2 DEPENDENCE ON RENAL DIALYSIS: ICD-10-CM

## 2022-06-27 DIAGNOSIS — Z98.890 OTHER SPECIFIED POSTPROCEDURAL STATES: Chronic | ICD-10-CM

## 2022-06-27 DIAGNOSIS — Z98.61 CORONARY ANGIOPLASTY STATUS: Chronic | ICD-10-CM

## 2022-06-27 LAB
ANION GAP SERPL CALC-SCNC: 15 MMOL/L — SIGNIFICANT CHANGE UP (ref 5–17)
BUN SERPL-MCNC: 84 MG/DL — HIGH (ref 7–23)
CALCIUM SERPL-MCNC: 10.9 MG/DL — HIGH (ref 8.4–10.5)
CHLORIDE SERPL-SCNC: 95 MMOL/L — LOW (ref 96–108)
CO2 SERPL-SCNC: 29 MMOL/L — SIGNIFICANT CHANGE UP (ref 22–31)
CREAT SERPL-MCNC: 4.76 MG/DL — HIGH (ref 0.5–1.3)
EGFR: 12 ML/MIN/1.73M2 — LOW
GLUCOSE SERPL-MCNC: 174 MG/DL — HIGH (ref 70–99)
HCT VFR BLD CALC: 35.2 % — LOW (ref 39–50)
HGB BLD-MCNC: 11.3 G/DL — LOW (ref 13–17)
MCHC RBC-ENTMCNC: 27.1 PG — SIGNIFICANT CHANGE UP (ref 27–34)
MCHC RBC-ENTMCNC: 32.1 GM/DL — SIGNIFICANT CHANGE UP (ref 32–36)
MCV RBC AUTO: 84.4 FL — SIGNIFICANT CHANGE UP (ref 80–100)
NRBC # BLD: 0 /100 WBCS — SIGNIFICANT CHANGE UP (ref 0–0)
PLATELET # BLD AUTO: 157 K/UL — SIGNIFICANT CHANGE UP (ref 150–400)
POTASSIUM SERPL-MCNC: 3.2 MMOL/L — LOW (ref 3.5–5.3)
POTASSIUM SERPL-SCNC: 3.2 MMOL/L — LOW (ref 3.5–5.3)
RBC # BLD: 4.17 M/UL — LOW (ref 4.2–5.8)
RBC # FLD: 12.9 % — SIGNIFICANT CHANGE UP (ref 10.3–14.5)
SARS-COV-2 RNA SPEC QL NAA+PROBE: SIGNIFICANT CHANGE UP
SODIUM SERPL-SCNC: 139 MMOL/L — SIGNIFICANT CHANGE UP (ref 135–145)
WBC # BLD: 6.88 K/UL — SIGNIFICANT CHANGE UP (ref 3.8–10.5)
WBC # FLD AUTO: 6.88 K/UL — SIGNIFICANT CHANGE UP (ref 3.8–10.5)

## 2022-06-27 PROCEDURE — U0003: CPT

## 2022-06-27 PROCEDURE — 85027 COMPLETE CBC AUTOMATED: CPT

## 2022-06-27 PROCEDURE — 36415 COLL VENOUS BLD VENIPUNCTURE: CPT

## 2022-06-27 PROCEDURE — U0005: CPT

## 2022-06-27 PROCEDURE — 80048 BASIC METABOLIC PNL TOTAL CA: CPT

## 2022-06-27 PROCEDURE — 83036 HEMOGLOBIN GLYCOSYLATED A1C: CPT

## 2022-06-27 PROCEDURE — G0463: CPT

## 2022-06-27 RX ORDER — LEVOTHYROXINE SODIUM 125 MCG
1 TABLET ORAL
Qty: 0 | Refills: 0 | DISCHARGE

## 2022-06-27 RX ORDER — INSULIN ASPART 100 [IU]/ML
20 INJECTION, SOLUTION SUBCUTANEOUS
Qty: 0 | Refills: 0 | DISCHARGE

## 2022-06-27 RX ORDER — CEFAZOLIN SODIUM 1 G
2000 VIAL (EA) INJECTION ONCE
Refills: 0 | Status: DISCONTINUED | OUTPATIENT
Start: 2022-07-01 | End: 2022-07-15

## 2022-06-27 RX ORDER — INSULIN ASPART 100 [IU]/ML
15 INJECTION, SOLUTION SUBCUTANEOUS
Qty: 0 | Refills: 0 | DISCHARGE

## 2022-06-27 RX ORDER — INSULIN GLARGINE 100 [IU]/ML
50 INJECTION, SOLUTION SUBCUTANEOUS
Qty: 0 | Refills: 0 | DISCHARGE

## 2022-06-27 RX ORDER — HYDRALAZINE HCL 50 MG
1 TABLET ORAL
Qty: 0 | Refills: 0 | DISCHARGE

## 2022-06-27 NOTE — H&P PST ADULT - PROBLEM SELECTOR PLAN 1
LEFT ARM ARTERIOVENOUS FISTULA CREATION  POSSIBLE RIGHT  POSSIBLE GRAFT  Pre-op education provided - all questions answered   Chlorhex soap & instructions given  CBC, BMP, Ha1c sent in PST  Basaglar - decrease dose by 20% - take 27 units at bedtime on 6/30 & 13 units in AM on 7/1  Hold Plavix 5 days preop - last dose 6/26 PM - email sent to Dr. Castellanos to confirm  Continue levothyroxine, gabapentin, hydralazine DOS w/small sips of water LEFT ARM ARTERIOVENOUS FISTULA CREATION  POSSIBLE RIGHT  POSSIBLE GRAFT  Pre-op education provided - all questions answered   Chlorhex soap & instructions given  CBC, BMP, Ha1c sent in PST  Basaglar - decrease dose by 20% - take 27 units at bedtime on 6/30 & 13 units in AM on 7/1  To remain on aspirin and Plavix as per Dr. Castellanos, patient called and informed to restart plavix  Continue levothyroxine, gabapentin, hydralazine DOS w/small sips of water

## 2022-06-27 NOTE — H&P PST ADULT - HISTORY OF PRESENT ILLNESS
71 year old male, with a history of CKD Stage V, presents for left AV fistula. Pt. with a history of progressively worsening CKD, currently being evaluated for renal transplant. No hemodialysis required as of yet.    ***Covid test sent 6/27/2022 at PST. 72 YO M PMH CAD s/p PCI x1 (10/2021, on aspirin & Plavix), HTN, HLD, T2DM (on insulin), hypothyroid, BPH, CKD Stage V (not on HD), presents to PST for LEFT ARM ARTERIOVENOUS FISTULA CREATION, POSSIBLE RIGHT, POSSIBLE GRAFT 7/1/2022. Denies any palpitations, SOB, N/V, Covid symptoms (fever, chills, cough) or exposure.      *Of note, procedure was originally scheduled for May 2022 but was cancelled d/t T2DM (a1c 8.7%).    ***Covid test sent 6/27/2022 at PST. 72 YO M PMH CAD s/p PCI x1 (10/2021, on aspirin & Plavix), HTN, HLD, T2DM (on insulin), hypothyroid, BPH, CKD Stage V (not on HD), presents to PST for LEFT ARM ARTERIOVENOUS FISTULA CREATION, POSSIBLE RIGHT, POSSIBLE GRAFT 7/1/2022. Denies any palpitations, SOB, N/V, Covid symptoms (fever, chills, cough) or exposure.      *Of note, procedure was originally scheduled for early June 2022 but was cancelled d/t uncontrolled T2DM (a1c 8.7%).    ***Covid test sent 6/27/2022 at PST.

## 2022-06-27 NOTE — H&P PST ADULT - FALL HARM RISK - HARM RISK INTERVENTIONS

## 2022-06-27 NOTE — H&P PST ADULT - NSICDXPASTMEDICALHX_GEN_ALL_CORE_FT
PAST MEDICAL HISTORY:  CAD (coronary artery disease) stent 10/2021    Gout     History of BPH     History of glaucoma     History of tobacco abuse     HTN (hypertension)     Hyperlipidemia     Hypothyroidism     Lung nodules followed by Dr. Solis (pulm)    Stage 5 chronic kidney disease not on chronic dialysis     Type 2 diabetes mellitus

## 2022-06-28 LAB
A1C WITH ESTIMATED AVERAGE GLUCOSE RESULT: 8.6 % — HIGH (ref 4–5.6)
ESTIMATED AVERAGE GLUCOSE: 200 MG/DL — HIGH (ref 68–114)

## 2022-06-30 ENCOUNTER — APPOINTMENT (OUTPATIENT)
Dept: GASTROENTEROLOGY | Facility: CLINIC | Age: 72
End: 2022-06-30

## 2022-06-30 ENCOUNTER — TRANSCRIPTION ENCOUNTER (OUTPATIENT)
Age: 72
End: 2022-06-30

## 2022-07-01 ENCOUNTER — OUTPATIENT (OUTPATIENT)
Dept: INPATIENT UNIT | Facility: HOSPITAL | Age: 72
LOS: 1 days | End: 2022-07-01
Payer: COMMERCIAL

## 2022-07-01 ENCOUNTER — TRANSCRIPTION ENCOUNTER (OUTPATIENT)
Age: 72
End: 2022-07-01

## 2022-07-01 ENCOUNTER — APPOINTMENT (OUTPATIENT)
Dept: VASCULAR SURGERY | Facility: HOSPITAL | Age: 72
End: 2022-07-01

## 2022-07-01 VITALS
OXYGEN SATURATION: 96 % | RESPIRATION RATE: 18 BRPM | WEIGHT: 199.96 LBS | SYSTOLIC BLOOD PRESSURE: 129 MMHG | HEIGHT: 68 IN | DIASTOLIC BLOOD PRESSURE: 74 MMHG | TEMPERATURE: 98 F | HEART RATE: 65 BPM

## 2022-07-01 VITALS
RESPIRATION RATE: 16 BRPM | OXYGEN SATURATION: 100 % | SYSTOLIC BLOOD PRESSURE: 114 MMHG | HEART RATE: 64 BPM | DIASTOLIC BLOOD PRESSURE: 64 MMHG

## 2022-07-01 DIAGNOSIS — Z99.2 DEPENDENCE ON RENAL DIALYSIS: ICD-10-CM

## 2022-07-01 DIAGNOSIS — Z98.61 CORONARY ANGIOPLASTY STATUS: Chronic | ICD-10-CM

## 2022-07-01 DIAGNOSIS — Z01.818 ENCOUNTER FOR OTHER PREPROCEDURAL EXAMINATION: ICD-10-CM

## 2022-07-01 DIAGNOSIS — Z98.890 OTHER SPECIFIED POSTPROCEDURAL STATES: Chronic | ICD-10-CM

## 2022-07-01 DIAGNOSIS — N18.6 END STAGE RENAL DISEASE: ICD-10-CM

## 2022-07-01 PROBLEM — E03.9 HYPOTHYROIDISM, UNSPECIFIED: Chronic | Status: ACTIVE | Noted: 2022-06-27

## 2022-07-01 LAB
GAS PNL BLDV: SIGNIFICANT CHANGE UP
GLUCOSE BLDC GLUCOMTR-MCNC: 104 MG/DL — HIGH (ref 70–99)
GLUCOSE BLDC GLUCOMTR-MCNC: 146 MG/DL — HIGH (ref 70–99)

## 2022-07-01 PROCEDURE — C9399: CPT

## 2022-07-01 PROCEDURE — C1889: CPT

## 2022-07-01 PROCEDURE — 82803 BLOOD GASES ANY COMBINATION: CPT

## 2022-07-01 PROCEDURE — 82962 GLUCOSE BLOOD TEST: CPT

## 2022-07-01 PROCEDURE — 36821 AV FUSION DIRECT ANY SITE: CPT | Mod: LT

## 2022-07-01 PROCEDURE — 36818 AV FUSE UPPR ARM CEPHALIC: CPT

## 2022-07-01 PROCEDURE — 84132 ASSAY OF SERUM POTASSIUM: CPT

## 2022-07-01 PROCEDURE — 85018 HEMOGLOBIN: CPT

## 2022-07-01 PROCEDURE — 82947 ASSAY GLUCOSE BLOOD QUANT: CPT

## 2022-07-01 PROCEDURE — 83605 ASSAY OF LACTIC ACID: CPT

## 2022-07-01 PROCEDURE — 85014 HEMATOCRIT: CPT

## 2022-07-01 PROCEDURE — 82435 ASSAY OF BLOOD CHLORIDE: CPT

## 2022-07-01 PROCEDURE — 82330 ASSAY OF CALCIUM: CPT

## 2022-07-01 PROCEDURE — 84295 ASSAY OF SERUM SODIUM: CPT

## 2022-07-01 DEVICE — SURGICEL FIBRILLAR 2 X 4": Type: IMPLANTABLE DEVICE | Site: LEFT | Status: FUNCTIONAL

## 2022-07-01 DEVICE — CLIP APPLIER COVIDIEN SURGICLIP III 9" SM: Type: IMPLANTABLE DEVICE | Site: LEFT | Status: FUNCTIONAL

## 2022-07-01 DEVICE — CLIP APPLIER ETHICON LIGACLIP 9 3/8" MEDIUM: Type: IMPLANTABLE DEVICE | Site: LEFT | Status: FUNCTIONAL

## 2022-07-01 RX ORDER — SODIUM CHLORIDE 9 MG/ML
3 INJECTION INTRAMUSCULAR; INTRAVENOUS; SUBCUTANEOUS EVERY 8 HOURS
Refills: 0 | Status: DISCONTINUED | OUTPATIENT
Start: 2022-07-01 | End: 2022-07-01

## 2022-07-01 RX ORDER — CHLORHEXIDINE GLUCONATE 213 G/1000ML
1 SOLUTION TOPICAL ONCE
Refills: 0 | Status: DISCONTINUED | OUTPATIENT
Start: 2022-07-01 | End: 2022-07-01

## 2022-07-01 RX ORDER — OXYCODONE HYDROCHLORIDE 5 MG/1
5 TABLET ORAL EVERY 6 HOURS
Refills: 0 | Status: DISCONTINUED | OUTPATIENT
Start: 2022-07-01 | End: 2022-07-01

## 2022-07-01 RX ORDER — OXYCODONE HYDROCHLORIDE 5 MG/1
1 TABLET ORAL
Qty: 8 | Refills: 0
Start: 2022-07-01 | End: 2022-07-02

## 2022-07-01 RX ORDER — LIDOCAINE HCL 20 MG/ML
0.2 VIAL (ML) INJECTION ONCE
Refills: 0 | Status: DISCONTINUED | OUTPATIENT
Start: 2022-07-01 | End: 2022-07-01

## 2022-07-01 RX ORDER — ACETAMINOPHEN 500 MG
650 TABLET ORAL EVERY 6 HOURS
Refills: 0 | Status: DISCONTINUED | OUTPATIENT
Start: 2022-07-01 | End: 2022-07-15

## 2022-07-01 RX ORDER — FENTANYL CITRATE 50 UG/ML
50 INJECTION INTRAVENOUS
Refills: 0 | Status: DISCONTINUED | OUTPATIENT
Start: 2022-07-01 | End: 2022-07-01

## 2022-07-01 RX ORDER — ACETAMINOPHEN 500 MG
2 TABLET ORAL
Qty: 0 | Refills: 0 | DISCHARGE
Start: 2022-07-01

## 2022-07-01 RX ORDER — ONDANSETRON 8 MG/1
4 TABLET, FILM COATED ORAL ONCE
Refills: 0 | Status: DISCONTINUED | OUTPATIENT
Start: 2022-07-01 | End: 2022-07-01

## 2022-07-01 RX ORDER — FENTANYL CITRATE 50 UG/ML
25 INJECTION INTRAVENOUS
Refills: 0 | Status: DISCONTINUED | OUTPATIENT
Start: 2022-07-01 | End: 2022-07-01

## 2022-07-01 NOTE — ASU PATIENT PROFILE, ADULT - FALL HARM RISK - HARM RISK INTERVENTIONS

## 2022-07-01 NOTE — ASU DISCHARGE PLAN (ADULT/PEDIATRIC) - CARE PROVIDER_API CALL
Mike Castellanos (MD)  Surgery  Vascular  [address], [address] [place] [address]  Phone: [phone]  Fax: [phone]  Follow Up Time: 2 weeks

## 2022-07-01 NOTE — ASU PATIENT PROFILE, ADULT - SURGICAL SITE INCISION
Quality 397: Melanoma: Reporting: Pathology does not include pT category and/or statement on Breslow depth, ulceration, and pT1 mitotic reporting. Detail Level: Detailed Quality 137: Melanoma: Continuity Of Care - Recall System: Patient information entered into a recall system that includes: target date for the next exam specified AND a process to follow up with patients regarding missed or unscheduled appointments Quality 226: Preventive Care And Screening: Tobacco Use: Screening And Cessation Intervention: Patient screened for tobacco use, is a smoker AND received Cessation Counseling Quality 138: Melanoma: Coordination Of Care: A treatment plan was communicated to the physicians providing continuing care within one month of diagnosis outlining: diagnosis, tumor thickness and a plan for surgery or alternate care. Quality 224: Stage 0-Iic Melanoma: Overutilization Of Imaging Studies For Only Stage 0-Iic Melanoma: Patient had one or more of the following imaging studies (chest X-ray, CT, Ultrasound, MRI, PET, nuclear medicine scan), for a clinical trial enrollment, ordered by another provider or other system reasons Quality 431: Preventive Care And Screening: Unhealthy Alcohol Use - Screening: Patient screened for unhealthy alcohol use using a single question and scores less than 2 times per year Quality 110: Preventive Care And Screening: Influenza Immunization: Influenza Immunization not Administered because Patient Refused. no

## 2022-07-01 NOTE — ASU DISCHARGE PLAN (ADULT/PEDIATRIC) - NS MD DC FALL RISK RISK
For information on Fall & Injury Prevention, visit: [email or web address] OR  [email or web address] OR  https://www.cdc.gov/steadi/patient.html

## 2022-07-01 NOTE — PRE-ANESTHESIA EVALUATION ADULT - NSANTHPMHFT_GEN_ALL_CORE
70 YO M PMH CAD s/p PCI x1 (10/2021, on aspirin & Plavix), HTN, HLD, T2DM (on insulin), hypothyroid, BPH, CKD Stage V (not on HD), presents to  for LEFT ARM ARTERIOVENOUS FISTULA CREATION, POSSIBLE RIGHT, POSSIBLE GRAFT

## 2022-07-01 NOTE — ASU DISCHARGE PLAN (ADULT/PEDIATRIC) - HAVE YOU HAD A FIRST COVID-19 BOOSTER?
Patient is scheduled for an appointment on 10-5-2020 and would like to have labs done prior to their appointment.  Please verify or place orders.      Callback Number: [phone]    Best Availability: Any    Can A Detailed Message Be Left?Yes    Additional Info: No    
Yes

## 2022-07-01 NOTE — BRIEF OPERATIVE NOTE - NSICDXBRIEFPROCEDURE_GEN_ALL_CORE_FT
PROCEDURES:  Creation, dialysis AV fistula 01-Jul-2022 11:04:24 left upper extremity, radiocephalic Irais Pavon

## 2022-07-13 ENCOUNTER — APPOINTMENT (OUTPATIENT)
Dept: VASCULAR SURGERY | Facility: CLINIC | Age: 72
End: 2022-07-13

## 2022-07-13 VITALS
BODY MASS INDEX: 30.31 KG/M2 | HEART RATE: 63 BPM | DIASTOLIC BLOOD PRESSURE: 72 MMHG | TEMPERATURE: 98.5 F | SYSTOLIC BLOOD PRESSURE: 129 MMHG | HEIGHT: 68 IN | WEIGHT: 200 LBS

## 2022-07-13 PROCEDURE — 99024 POSTOP FOLLOW-UP VISIT: CPT

## 2022-07-13 PROCEDURE — 93990 DOPPLER FLOW TESTING: CPT

## 2022-07-13 NOTE — REASON FOR VISIT
[de-identified] : Left radiocephalic AVF creation [de-identified] : 07/01/22 [de-identified] : ` [de-identified] : Doing well, no complaints.

## 2022-07-13 NOTE — DISCUSSION/SUMMARY
[FreeTextEntry1] : Problem #1 Dialysis access\par - RUPERTO performed: volume flow 570 ml/min, AVF diameters 3.8-4.3mm. retrograde flow in radial artery\par - one month follow up with RUPERTO to eval for maturation\par

## 2022-08-24 ENCOUNTER — APPOINTMENT (OUTPATIENT)
Dept: VASCULAR SURGERY | Facility: CLINIC | Age: 72
End: 2022-08-24

## 2022-08-24 VITALS
BODY MASS INDEX: 30.31 KG/M2 | HEART RATE: 60 BPM | TEMPERATURE: 96.2 F | DIASTOLIC BLOOD PRESSURE: 70 MMHG | WEIGHT: 200 LBS | HEIGHT: 68 IN | SYSTOLIC BLOOD PRESSURE: 155 MMHG

## 2022-08-24 PROCEDURE — 99024 POSTOP FOLLOW-UP VISIT: CPT

## 2022-08-24 PROCEDURE — 93990 DOPPLER FLOW TESTING: CPT

## 2022-08-24 NOTE — REASON FOR VISIT
[de-identified] : Left radiocephalic AVF creation [de-identified] : 07/01/22 [de-identified] : Denies complaints. [Family Member] : family member

## 2022-08-24 NOTE — DISCUSSION/SUMMARY
[FreeTextEntry1] : Problem #1 Dialysis access\par - RUPERTO performed: volume flow 1167ml/min ml/min, AVF diameters 4.1-5.2mm. retrograde flow in radial artery\par - will schedule for fistulogram for balloon assisted maturation\par

## 2022-09-08 ENCOUNTER — APPOINTMENT (OUTPATIENT)
Dept: VASCULAR SURGERY | Facility: HOSPITAL | Age: 72
End: 2022-09-08

## 2022-09-16 ENCOUNTER — APPOINTMENT (OUTPATIENT)
Dept: VASCULAR SURGERY | Facility: HOSPITAL | Age: 72
End: 2022-09-16

## 2022-09-16 RX ORDER — FUROSEMIDE 40 MG
1 TABLET ORAL
Qty: 0 | Refills: 0 | DISCHARGE

## 2022-09-16 RX ORDER — INSULIN GLARGINE 100 [IU]/ML
16 INJECTION, SOLUTION SUBCUTANEOUS
Qty: 0 | Refills: 0 | DISCHARGE

## 2022-09-16 RX ORDER — GABAPENTIN 400 MG/1
1 CAPSULE ORAL
Qty: 0 | Refills: 0 | DISCHARGE

## 2022-09-16 RX ORDER — CALCIUM ACETATE 667 MG
1 TABLET ORAL
Qty: 0 | Refills: 0 | DISCHARGE

## 2022-09-28 ENCOUNTER — APPOINTMENT (OUTPATIENT)
Dept: VASCULAR SURGERY | Facility: CLINIC | Age: 72
End: 2022-09-28

## 2022-09-28 VITALS
SYSTOLIC BLOOD PRESSURE: 129 MMHG | BODY MASS INDEX: 30.31 KG/M2 | DIASTOLIC BLOOD PRESSURE: 69 MMHG | WEIGHT: 200 LBS | HEART RATE: 50 BPM | HEIGHT: 68 IN | TEMPERATURE: 96 F

## 2022-09-28 DIAGNOSIS — E11.22 TYPE 2 DIABETES MELLITUS WITH DIABETIC CHRONIC KIDNEY DISEASE: ICD-10-CM

## 2022-09-28 DIAGNOSIS — N18.4 TYPE 2 DIABETES MELLITUS WITH DIABETIC CHRONIC KIDNEY DISEASE: ICD-10-CM

## 2022-09-28 PROCEDURE — 99024 POSTOP FOLLOW-UP VISIT: CPT

## 2022-09-28 PROCEDURE — 93990 DOPPLER FLOW TESTING: CPT

## 2022-09-28 NOTE — HISTORY OF PRESENT ILLNESS
[FreeTextEntry1] : 71 year old male with CKD Stage V referred for long term hemodialysis access creation\par \par Nephrologist: Dr. Ortiz\par \par Right hand dominant. No pacemaker or AICD. [de-identified] : s/p left radiocephalic AVF creation\par presents for routine evaluation\par not yet on HD

## 2022-09-28 NOTE — ASSESSMENT
[FreeTextEntry1] : Problem #1 Long term dialysis access creation\par - will schedule for balloon assisted maturation of LUE AVF with minimal contrast use for fistula salvage

## 2022-09-28 NOTE — DATA REVIEWED
[FreeTextEntry1] : doris-anastomotic stenosis and outflow stenosis\par reduced volume flow to ~480ml/min

## 2022-10-13 ENCOUNTER — APPOINTMENT (OUTPATIENT)
Dept: VASCULAR SURGERY | Facility: HOSPITAL | Age: 72
End: 2022-10-13

## 2022-10-13 ENCOUNTER — OUTPATIENT (OUTPATIENT)
Dept: OUTPATIENT SERVICES | Facility: HOSPITAL | Age: 72
LOS: 1 days | Discharge: HOME | End: 2022-10-13

## 2022-10-13 DIAGNOSIS — Z98.890 OTHER SPECIFIED POSTPROCEDURAL STATES: Chronic | ICD-10-CM

## 2022-10-13 DIAGNOSIS — Z98.61 CORONARY ANGIOPLASTY STATUS: Chronic | ICD-10-CM

## 2022-10-13 DIAGNOSIS — N18.6 END STAGE RENAL DISEASE: ICD-10-CM

## 2022-10-13 LAB — GLUCOSE BLDC GLUCOMTR-MCNC: 154 MG/DL — HIGH (ref 70–99)

## 2022-10-13 PROCEDURE — 0715T: CPT

## 2022-10-13 PROCEDURE — 99152 MOD SED SAME PHYS/QHP 5/>YRS: CPT

## 2022-10-13 PROCEDURE — 36902 INTRO CATH DIALYSIS CIRCUIT: CPT | Mod: LT

## 2022-10-13 PROCEDURE — 76937 US GUIDE VASCULAR ACCESS: CPT | Mod: 26,LT

## 2022-10-13 RX ORDER — DORZOLAMIDE HYDROCHLORIDE TIMOLOL MALEATE 20; 5 MG/ML; MG/ML
1 SOLUTION/ DROPS OPHTHALMIC
Qty: 0 | Refills: 0 | DISCHARGE

## 2022-10-13 RX ORDER — ASPIRIN/CALCIUM CARB/MAGNESIUM 324 MG
1 TABLET ORAL
Qty: 0 | Refills: 0 | DISCHARGE

## 2022-10-13 RX ORDER — LATANOPROST 0.05 MG/ML
1 SOLUTION/ DROPS OPHTHALMIC; TOPICAL
Qty: 0 | Refills: 0 | DISCHARGE

## 2022-10-13 RX ORDER — CALCIUM ACETATE 667 MG
1 TABLET ORAL
Qty: 0 | Refills: 0 | DISCHARGE

## 2022-10-13 RX ORDER — INSULIN ASPART 100 [IU]/ML
0 INJECTION, SOLUTION SUBCUTANEOUS
Qty: 0 | Refills: 0 | DISCHARGE

## 2022-10-13 RX ORDER — LEVOTHYROXINE SODIUM 125 MCG
1 TABLET ORAL
Qty: 0 | Refills: 0 | DISCHARGE

## 2022-10-13 RX ORDER — SODIUM CHLORIDE 9 MG/ML
500 INJECTION INTRAMUSCULAR; INTRAVENOUS; SUBCUTANEOUS
Refills: 0 | Status: DISCONTINUED | OUTPATIENT
Start: 2022-10-13 | End: 2022-10-27

## 2022-10-13 RX ORDER — CLOPIDOGREL BISULFATE 75 MG/1
1 TABLET, FILM COATED ORAL
Qty: 0 | Refills: 0 | DISCHARGE

## 2022-10-13 RX ORDER — FINASTERIDE 5 MG/1
1 TABLET, FILM COATED ORAL
Qty: 0 | Refills: 0 | DISCHARGE

## 2022-10-13 RX ORDER — TAMSULOSIN HYDROCHLORIDE 0.4 MG/1
1 CAPSULE ORAL
Qty: 0 | Refills: 0 | DISCHARGE

## 2022-10-13 RX ORDER — INSULIN GLARGINE 100 [IU]/ML
34 INJECTION, SOLUTION SUBCUTANEOUS
Qty: 0 | Refills: 0 | DISCHARGE

## 2022-10-13 RX ORDER — DORZOLAMIDE HYDROCHLORIDE 20 MG/ML
1 SOLUTION/ DROPS OPHTHALMIC
Qty: 0 | Refills: 0 | DISCHARGE

## 2022-10-13 RX ORDER — HYDRALAZINE HCL 50 MG
1 TABLET ORAL
Qty: 0 | Refills: 0 | DISCHARGE

## 2022-10-13 RX ORDER — ROSUVASTATIN CALCIUM 5 MG/1
1 TABLET ORAL
Qty: 0 | Refills: 0 | DISCHARGE

## 2022-10-13 RX ORDER — SODIUM CHLORIDE 9 MG/ML
3 INJECTION INTRAMUSCULAR; INTRAVENOUS; SUBCUTANEOUS EVERY 8 HOURS
Refills: 0 | Status: DISCONTINUED | OUTPATIENT
Start: 2022-10-13 | End: 2022-10-27

## 2022-10-13 RX ORDER — INSULIN GLARGINE 100 [IU]/ML
16 INJECTION, SOLUTION SUBCUTANEOUS
Qty: 0 | Refills: 0 | DISCHARGE

## 2022-10-13 RX ADMIN — SODIUM CHLORIDE 3 MILLILITER(S): 9 INJECTION INTRAMUSCULAR; INTRAVENOUS; SUBCUTANEOUS at 12:51

## 2022-10-13 RX ADMIN — SODIUM CHLORIDE 50 MILLILITER(S): 9 INJECTION INTRAMUSCULAR; INTRAVENOUS; SUBCUTANEOUS at 12:50

## 2022-10-13 NOTE — H&P CARDIOLOGY - NSICDXPASTMEDICALHX_GEN_ALL_CORE_FT
PAST MEDICAL HISTORY:  CAD (coronary artery disease) stent 10/2021    Gout     History of BPH     History of tobacco abuse     HTN (hypertension)     Hyperlipidemia     Hypothyroidism     Lung nodules followed by Dr. Solis (pulm)    Stage 5 chronic kidney disease not on chronic dialysis     Type 2 diabetes mellitus

## 2022-10-13 NOTE — H&P CARDIOLOGY - HISTORY OF PRESENT ILLNESS
73 y/o M with PMH of HTN, HLD, DM type II, CAD(s/p 1 stent placed last year), CKD(with LUE radiocephalic AVF and not on HD), BPH presented to [facility] for AV fistulogram. Patient stated that he has been in his usual state of health and denied any current complaints. Patient denied any pain or numbness of the LUE. Pt was originally planned to have fistulogram on 9/16 but was hesitant to proceed given the need to use contrast dye and his history of acute renal failure. Pt followed up in hopes that his fistula would naturally mature but it has instead only become more stenosed. Pt is now agreeable to have BAM.

## 2022-11-02 NOTE — HISTORY OF PRESENT ILLNESS
[FreeTextEntry1] : Mr. LESTER SARABIA is a 72 year old male who returns with regard to a hx of type 2 dm. The diabetes has been present for about 32 years. does note hx of retinopathy- is getting injections in the right eye. Does have CKD with gfr around 15 with AVF creation in July 2022. Has had Pre kidney transplant evaluation. With regard to neuropathy ,he denies any neurologic s/s. \par \par For the diabetes, he has been on insulin since age 49. He is currently taking Basaglar 15 units am and 35 units hs and Novolog insulin 15 units pre breakfast, 20 units pre lunch, 15 units pre dinner. \par \par He denies polyuria, polydipsia, or any visual changes. He too denies any skin lesions, skin breakdown or non-healing areas of skin. He too denies any podiatric concerns. Ophthalmologic evaluation is up to date\par \par Home glucose monitoring has shown values to be running\par Lunch is at 1-2 pm. \par \par \par POCT A1C returned today    ; previously  9.1 % on 5/24/22\par POCT glucose returned today at    mg/dl\par \par \par Additional medical history includes that of Cad, .htn. CKD as noted, BPH, and hypothyroidism on Synthroid 88 mcg \par Had coronary stent placed 2021 at [facility]\par \par Meds: Gabapentin 100 mg 2 tabs daily,, Finasteride 5 mg, HCTZ 50 mg, ASA 81 mg, Rosuvastatin 10 mg, Furosemide 80 mg, Clopidogrel 75 mg, Tamsulosin, Osteo shell calcium \par \par \par PMD Dr. Dick Reece\par Cardiologist: Dr. Sarwat Coello.\par Nephrologist: Dr. Ortiz in [place]

## 2022-11-03 ENCOUNTER — APPOINTMENT (OUTPATIENT)
Dept: ENDOCRINOLOGY | Facility: CLINIC | Age: 72
End: 2022-11-03

## 2022-11-09 ENCOUNTER — APPOINTMENT (OUTPATIENT)
Dept: VASCULAR SURGERY | Facility: CLINIC | Age: 72
End: 2022-11-09

## 2022-11-09 VITALS
DIASTOLIC BLOOD PRESSURE: 68 MMHG | HEIGHT: 68 IN | WEIGHT: 200 LBS | HEART RATE: 73 BPM | BODY MASS INDEX: 30.31 KG/M2 | TEMPERATURE: 97.6 F | SYSTOLIC BLOOD PRESSURE: 134 MMHG

## 2022-11-09 PROCEDURE — 93990 DOPPLER FLOW TESTING: CPT

## 2022-11-09 PROCEDURE — 99213 OFFICE O/P EST LOW 20 MIN: CPT

## 2022-11-09 RX ORDER — PREDNISONE 5 MG/1
5 TABLET ORAL
Qty: 30 | Refills: 0 | Status: ACTIVE | COMMUNITY
Start: 2022-10-31

## 2022-11-09 RX ORDER — HYDRALAZINE HYDROCHLORIDE 50 MG/1
50 TABLET ORAL
Qty: 90 | Refills: 0 | Status: ACTIVE | COMMUNITY
Start: 2022-06-07

## 2022-11-09 RX ORDER — OXYCODONE 5 MG/1
5 TABLET ORAL
Qty: 8 | Refills: 0 | Status: ACTIVE | COMMUNITY
Start: 2022-07-01

## 2022-11-09 RX ORDER — FENOFIBRATE 160 MG/1
160 TABLET ORAL
Qty: 90 | Refills: 0 | Status: ACTIVE | COMMUNITY
Start: 2022-09-01

## 2022-11-09 RX ORDER — POTASSIUM CHLORIDE 750 MG/1
10 TABLET, FILM COATED, EXTENDED RELEASE ORAL
Qty: 30 | Refills: 0 | Status: ACTIVE | COMMUNITY
Start: 2022-10-28

## 2022-11-09 RX ORDER — METHYLPREDNISOLONE 4 MG/1
4 TABLET ORAL
Qty: 21 | Refills: 0 | Status: ACTIVE | COMMUNITY
Start: 2022-10-21

## 2022-11-09 RX ORDER — SEVELAMER CARBONATE 800 MG/1
800 TABLET, FILM COATED ORAL
Qty: 180 | Refills: 0 | Status: ACTIVE | COMMUNITY
Start: 2022-11-02

## 2022-11-09 RX ORDER — CALCIUM ACETATE 667 MG/1
667 CAPSULE ORAL
Qty: 90 | Refills: 0 | Status: ACTIVE | COMMUNITY
Start: 2022-05-24

## 2022-11-09 NOTE — PHYSICAL EXAM
[Respiratory Effort] : normal respiratory effort [Normal Rate and Rhythm] : normal rate and rhythm [Skin Ulcer] : no ulcer [Alert] : alert [Oriented to Person] : oriented to person [Oriented to Place] : oriented to place [Oriented to Time] : oriented to time [Calm] : calm [de-identified] : appears stated age [de-identified] : normocephalic, atraumatic [de-identified] : supple [FreeTextEntry1] : palpable thrill throughout left forearm AVF

## 2022-11-09 NOTE — ASSESSMENT
[FreeTextEntry1] : Problem #1 Long term dialysis access creation\par - whenever pt needs to start HD, the AVF is mature and ready to  be used\par - follow up with Dr. Ortiz\par - follow up in 3 months for interval surveillance

## 2022-11-09 NOTE — HISTORY OF PRESENT ILLNESS
[FreeTextEntry1] : 71 year old male with CKD Stage V referred for long term hemodialysis access creation\par \par Nephrologist: Dr. Ortiz\par \par Right hand dominant. No pacemaker or AICD. [de-identified] : s/p left radiocephalic AVF creation\par s/p balloon assisted maturation\par presents for follow up\par not yet on HD

## 2022-11-10 ENCOUNTER — APPOINTMENT (OUTPATIENT)
Dept: NEPHROLOGY | Facility: CLINIC | Age: 72
End: 2022-11-10

## 2022-12-20 ENCOUNTER — APPOINTMENT (OUTPATIENT)
Dept: ENDOCRINOLOGY | Facility: CLINIC | Age: 72
End: 2022-12-20

## 2023-02-24 ENCOUNTER — NON-APPOINTMENT (OUTPATIENT)
Age: 73
End: 2023-02-24

## 2023-02-24 ENCOUNTER — APPOINTMENT (OUTPATIENT)
Dept: NEPHROLOGY | Facility: CLINIC | Age: 73
End: 2023-02-24

## 2023-03-03 ENCOUNTER — NON-APPOINTMENT (OUTPATIENT)
Age: 73
End: 2023-03-03

## 2023-05-19 ENCOUNTER — APPOINTMENT (OUTPATIENT)
Dept: TRANSPLANT | Facility: CLINIC | Age: 73
End: 2023-05-19

## 2023-05-19 ENCOUNTER — APPOINTMENT (OUTPATIENT)
Dept: NEPHROLOGY | Facility: CLINIC | Age: 73
End: 2023-05-19

## 2023-05-26 LAB
ABO + RH PNL BLD: NORMAL
ALBUMIN SERPL ELPH-MCNC: 4.2 G/DL
ALP BLD-CCNC: 63 U/L
ALT SERPL-CCNC: 25 U/L
ANION GAP SERPL CALC-SCNC: 12 MMOL/L
AST SERPL-CCNC: 31 U/L
BILIRUB SERPL-MCNC: 0.4 MG/DL
BUN SERPL-MCNC: 23 MG/DL
C PEPTIDE SERPL-MCNC: 4.8 NG/ML
CALCIUM SERPL-MCNC: 9.2 MG/DL
CHLORIDE SERPL-SCNC: 103 MMOL/L
CHOLEST SERPL-MCNC: 101 MG/DL
CK SERPL-CCNC: 162 U/L
CMV IGG SERPL QL: >10 U/ML
CMV IGG SERPL-IMP: POSITIVE
CO2 SERPL-SCNC: 28 MMOL/L
COVID-19 SPIKE DOMAIN ANTIBODY INTERPRETATION: POSITIVE
CREAT SERPL-MCNC: 3.4 MG/DL
CRP SERPL-MCNC: 4 MG/L
EBV EA AB SER IA-ACNC: <5 U/ML
EBV EA AB TITR SER IF: POSITIVE
EBV EA IGG SER QL IA: 286 U/ML
EBV EA IGG SER-ACNC: NEGATIVE
EBV EA IGM SER IA-ACNC: NEGATIVE
EBV PATRN SPEC IB-IMP: NORMAL
EBV VCA IGG SER IA-ACNC: 519 U/ML
EBV VCA IGM SER QL IA: <10 U/ML
EGFR: 18 ML/MIN/1.73M2
EPSTEIN-BARR VIRUS CAPSID ANTIGEN IGG: POSITIVE
ERYTHROCYTE [SEDIMENTATION RATE] IN BLOOD BY WESTERGREN METHOD: 19 MM/HR
ESTIMATED AVERAGE GLUCOSE: 157 MG/DL
GLUCOSE SERPL-MCNC: 198 MG/DL
HAV IGM SER QL: NONREACTIVE
HBA1C MFR BLD HPLC: 7.1 %
HBV CORE IGG+IGM SER QL: NONREACTIVE
HBV SURFACE AB SER QL: REACTIVE
HBV SURFACE AB SERPL IA-ACNC: 50.8 MIU/ML
HBV SURFACE AG SER QL: NONREACTIVE
HCV AB SER QL: NONREACTIVE
HCV S/CO RATIO: 0.14 S/CO
HDLC SERPL-MCNC: 28 MG/DL
HEPATITIS A IGG ANTIBODY: REACTIVE
HIV1+2 AB SPEC QL IA.RAPID: NONREACTIVE
HSV 1+2 IGG SER IA-IMP: POSITIVE
HSV1 IGG SER QL: 56 INDEX
HSV1 IGG SER QL: 56 INDEX
HSV1 IGM SER QL: NEGATIVE
HSV2 AB FLD-ACNC: NEGATIVE
HSV2 IGG SER QL: 17.9 INDEX
LDLC SERPL CALC-MCNC: 28 MG/DL
M TB IFN-G BLD-IMP: NEGATIVE
MAGNESIUM SERPL-MCNC: 2.2 MG/DL
NONHDLC SERPL-MCNC: 73 MG/DL
PHOSPHATE SERPL-MCNC: 2.7 MG/DL
POTASSIUM SERPL-SCNC: 4 MMOL/L
PROT SERPL-MCNC: 6.7 G/DL
PSA SERPL-MCNC: 1.72 NG/ML
QUANTIFERON TB PLUS MITOGEN MINUS NIL: 9.17 IU/ML
QUANTIFERON TB PLUS NIL: 0.03 IU/ML
QUANTIFERON TB PLUS TB1 MINUS NIL: -0.01 IU/ML
QUANTIFERON TB PLUS TB2 MINUS NIL: -0.01 IU/ML
RUBV IGG FLD-ACNC: 7.5 INDEX
RUBV IGG SER-IMP: POSITIVE
SARS-COV-2 AB SERPL IA-ACNC: 163 U/ML
SODIUM SERPL-SCNC: 143 MMOL/L
T GONDII AB SER-IMP: POSITIVE
T GONDII IGG SER QL: 13.9 IU/ML
T PALLIDUM AB SER QL IA: NEGATIVE
T3 SERPL-MCNC: 83 NG/DL
T4 FREE SERPL-MCNC: 1.4 NG/DL
TRIGL SERPL-MCNC: 221 MG/DL
TSH SERPL-ACNC: 1.82 UIU/ML
URATE SERPL-MCNC: 1.7 MG/DL
VZV AB TITR SER: POSITIVE
VZV IGG SER IF-ACNC: 511.4 INDEX

## 2023-06-16 ENCOUNTER — APPOINTMENT (OUTPATIENT)
Dept: NEPHROLOGY | Facility: CLINIC | Age: 73
End: 2023-06-16
Payer: COMMERCIAL

## 2023-06-16 VITALS
RESPIRATION RATE: 15 BRPM | DIASTOLIC BLOOD PRESSURE: 64 MMHG | SYSTOLIC BLOOD PRESSURE: 117 MMHG | HEIGHT: 68 IN | BODY MASS INDEX: 29.7 KG/M2 | OXYGEN SATURATION: 96 % | WEIGHT: 196 LBS | HEART RATE: 55 BPM

## 2023-06-16 DIAGNOSIS — N18.6 END STAGE RENAL DISEASE: ICD-10-CM

## 2023-06-16 DIAGNOSIS — Z99.2 END STAGE RENAL DISEASE: ICD-10-CM

## 2023-06-16 PROCEDURE — 99213 OFFICE O/P EST LOW 20 MIN: CPT

## 2023-06-16 RX ORDER — CLOPIDOGREL BISULFATE 75 MG/1
75 TABLET, FILM COATED ORAL DAILY
Refills: 0 | Status: DISCONTINUED | COMMUNITY
End: 2023-06-16

## 2023-06-16 RX ORDER — FUROSEMIDE 80 MG/1
80 TABLET ORAL
Qty: 60 | Refills: 0 | Status: DISCONTINUED | COMMUNITY
Start: 2022-05-17 | End: 2023-06-16

## 2023-06-16 RX ORDER — POLYETHYLENE GLYCOL-3350 AND ELECTROLYTES WITH FLAVOR PACK 240; 5.84; 2.98; 6.72; 22.72 G/278.26G; G/278.26G; G/278.26G; G/278.26G; G/278.26G
240 POWDER, FOR SOLUTION ORAL
Qty: 1 | Refills: 0 | Status: DISCONTINUED | COMMUNITY
Start: 2022-02-15 | End: 2023-06-16

## 2023-06-16 RX ORDER — POLYETHYLENE GLYCOL-3350 AND ELECTROLYTES WITH FLAVOR PACK 240; 5.84; 2.98; 6.72; 22.72 G/278.26G; G/278.26G; G/278.26G; G/278.26G; G/278.26G
240 POWDER, FOR SOLUTION ORAL
Qty: 1 | Refills: 0 | Status: DISCONTINUED | COMMUNITY
Start: 2022-04-25 | End: 2023-06-16

## 2023-06-16 RX ORDER — FUROSEMIDE 40 MG/1
40 TABLET ORAL TWICE DAILY
Refills: 0 | Status: DISCONTINUED | COMMUNITY
End: 2023-06-16

## 2023-06-16 NOTE — HISTORY OF PRESENT ILLNESS
[FreeTextEntry1] : Pt is a 72 y.o male who presents for f/u while listed for kidney transplantation.  \par Patient has known CKD (2000), preemptive. His nephrologist was .  He started dialysis February 2023 via AVF.  His nephrologist is NORMAN Dash in [place].  \par \par He has known DM (1990) On insulin (age 49); HTN (1985). H/o Hyperlipidemia/ Gout.  He has retinopathy, no neuropathy.  No foot amputations.  \par H/o CAD, stent placed in Nov 2021, Dr. Greta Coello at [facility]. No chest pain or SIERRA.  He can walk 1-2 blocks.  Limited by balance.  \par No known h/o kidney stone. He has Prostatism on Tamsulosin.\par He is on Plavix.\par No recent hospitalizations.  \par \par Most recent testing\par Cardiac- Dr Mullen\par 03/31/22 - sinus bradycardia, left axis, old anteroseptal infarct \par 04/20/22 (regadenoson tetrofosmin) - no evidence of infarction or inducible ischemia, LVEF 70%\par 04/20/22 ECHO - TDS, MAC, normal LA, grossly normal LV systolic function, LVEF 65%\par \par ASSESSMENT/PLAN:\par At this time, patient is considered an acceptable risk from a cardiac standpoint. Follow up annually pre-transplant. \par \par Radiology \par CT Chest abd and pelvis. no contrast. 2/23/22 Scattered small pulmonary nodules largest 5 mm moderate if patient is high risk for lung cancer, follow up chest CT in 12 months can be preformed. prostatomegaly. 1.3 cm left adrenal adenoma. \par \par Cancer Screening\par PSA 5/26/23 1.72\par Colonoscopy 5/10/22 internal hemorrhoids. multiple polyps. path showing both hyperplastic and tubular adenomas repeat 3 years.

## 2023-06-16 NOTE — PHYSICAL EXAM
[General Appearance - Alert] : alert [General Appearance - In No Acute Distress] : in no acute distress [Sclera] : the sclera and conjunctiva were normal [PERRL With Normal Accommodation] : pupils were equal in size, round, and reactive to light [Extraocular Movements] : extraocular movements were intact [Outer Ear] : the ears and nose were normal in appearance [Oropharynx] : the oropharynx was normal [Neck Appearance] : the appearance of the neck was normal [Neck Cervical Mass (___cm)] : no neck mass was observed [Jugular Venous Distention Increased] : there was no jugular-venous distention [Thyroid Nodule] : there were no palpable thyroid nodules [Auscultation Breath Sounds / Voice Sounds] : lungs were clear to auscultation bilaterally [Heart Rate And Rhythm] : heart rate was normal and rhythm regular [Heart Sounds] : normal S1 and S2 [Heart Sounds Gallop] : no gallops [Heart Sounds Pericardial Friction Rub] : no pericardial rub [Full Pulse] : the pedal pulses are present [Bowel Sounds] : normal bowel sounds [Abdomen Soft] : soft [Abdomen Tenderness] : non-tender [Abdomen Mass (___ Cm)] : no abdominal mass palpated [Cervical Lymph Nodes Enlarged Posterior Bilaterally] : posterior cervical [Cervical Lymph Nodes Enlarged Anterior Bilaterally] : anterior cervical [Supraclavicular Lymph Nodes Enlarged Bilaterally] : supraclavicular [Axillary Lymph Nodes Enlarged Bilaterally] : axillary [Involuntary Movements] : no involuntary movements were seen [] : no rash [No Focal Deficits] : no focal deficits [Oriented To Time, Place, And Person] : oriented to person, place, and time [Impaired Insight] : insight and judgment were intact [Affect] : the affect was normal [Edema] : there was no peripheral edema [FreeTextEntry1] : right DP pulse stronger than left.

## 2023-06-16 NOTE — PLAN
[FreeTextEntry1] : 1.  ESRD on dialysis - Pt is a fair candidate for kidney transplantation.  His functional capacity is limited, however he is able to ambulate without any assistance devices.  Not willing to accept living donation\par 2.  DM2 - On high doses of insulin, will need to check A1c\par 3.  Pulmonary nodules - need to repeat chest CT.  \par 4.  CAD - repeat echo and stress test. He has been taken off of plavix.  Had one stent placed in 2021 ([facility]). \par 5.  BPH on flomax.  Check PSA\par 6.  Imaging - check CT a/p.  \par 7.  GI - colonoscopy up to date. \par \par I have personally discussed the risks and benefits of transplantation and patient attended transplant education class where the following was disclosed:\par  \par Reviewed factors affecting survival and morbidity while on dialysis, the transplant wait list and reviewed doris-operative and long-term risk factors affecting outcome in kidney transplantation.  \par  \par One year SRTR outcomes for national and [facility] were discussed in regards to patient survival and graft survival after transplantation.  \par  \par Details of transplant surgery, including complications were discussed.\par Immunosuppression and complications including infection including life threatening sepsis and opportunistic infections, malignancy and new onset diabetes were discussed.  \par  \par Benefits of live donor transplantation as well as variability in wait times across regions and multiple listing were discussed. \par KDPI >85% and PHS high risk criteria donors were discussed. \par HCV kidney transplantation was discussed.\par  \par Will proceed with completing/ updating work up and listing for transplant/ live donor transplant once work up is reviewed and found to be acceptable by multidisciplinary listing committee.\par \par

## 2023-07-10 ENCOUNTER — APPOINTMENT (OUTPATIENT)
Dept: CT IMAGING | Facility: IMAGING CENTER | Age: 73
End: 2023-07-10
Payer: COMMERCIAL

## 2023-07-10 ENCOUNTER — OUTPATIENT (OUTPATIENT)
Dept: OUTPATIENT SERVICES | Facility: HOSPITAL | Age: 73
LOS: 1 days | End: 2023-07-10
Payer: COMMERCIAL

## 2023-07-10 DIAGNOSIS — Z01.818 ENCOUNTER FOR OTHER PREPROCEDURAL EXAMINATION: ICD-10-CM

## 2023-07-10 DIAGNOSIS — Z98.890 OTHER SPECIFIED POSTPROCEDURAL STATES: Chronic | ICD-10-CM

## 2023-07-10 DIAGNOSIS — Z98.61 CORONARY ANGIOPLASTY STATUS: Chronic | ICD-10-CM

## 2023-07-10 PROCEDURE — 71260 CT THORAX DX C+: CPT

## 2023-07-10 PROCEDURE — 74177 CT ABD & PELVIS W/CONTRAST: CPT

## 2023-07-10 PROCEDURE — 71260 CT THORAX DX C+: CPT | Mod: 26

## 2023-07-10 PROCEDURE — 74177 CT ABD & PELVIS W/CONTRAST: CPT | Mod: 26

## 2023-07-18 ENCOUNTER — NON-APPOINTMENT (OUTPATIENT)
Age: 73
End: 2023-07-18

## 2023-08-09 ENCOUNTER — APPOINTMENT (OUTPATIENT)
Dept: CARDIOLOGY | Facility: CLINIC | Age: 73
End: 2023-08-09

## 2023-08-11 ENCOUNTER — APPOINTMENT (OUTPATIENT)
Dept: NEPHROLOGY | Facility: CLINIC | Age: 73
End: 2023-08-11

## 2023-08-23 ENCOUNTER — NON-APPOINTMENT (OUTPATIENT)
Age: 73
End: 2023-08-23

## 2023-08-23 ENCOUNTER — APPOINTMENT (OUTPATIENT)
Dept: CARDIOLOGY | Facility: CLINIC | Age: 73
End: 2023-08-23
Payer: MEDICARE

## 2023-08-23 VITALS
WEIGHT: 198 LBS | HEART RATE: 78 BPM | BODY MASS INDEX: 30.01 KG/M2 | SYSTOLIC BLOOD PRESSURE: 147 MMHG | DIASTOLIC BLOOD PRESSURE: 72 MMHG | OXYGEN SATURATION: 96 % | RESPIRATION RATE: 16 BRPM | HEIGHT: 68 IN

## 2023-08-23 PROCEDURE — 99214 OFFICE O/P EST MOD 30 MIN: CPT | Mod: 25

## 2023-08-23 PROCEDURE — 93000 ELECTROCARDIOGRAM COMPLETE: CPT

## 2023-08-25 ENCOUNTER — NON-APPOINTMENT (OUTPATIENT)
Age: 73
End: 2023-08-25

## 2023-08-25 NOTE — DISCUSSION/SUMMARY
[Coronary Artery Disease] : coronary artery disease [Stable] : stable [Hypertension] : hypertension [Low Sodium Diet] : low sodium diet [FreeTextEntry1] : Currently stable from a cardiovascular standpoint. Hypertensive today. Currently in mild volume overload. Stable CAD (prior stent at [facility]). Exertional shortness of breath likely secondary to volume status. Patient with CKD stage IV not on dialysis (creatinine 3.40, eGFR 18). Continue current medications including aspirin and rosuvastatin. ECG completed today and reviewed. Will schedule a pharmacologic nuclear stress test to rule out any significant CAD/ischemia. In addition, will schedule an echocardiogram to reassess his cardiac structures and function. Pending the test results, I will make further recommendations. At this time, patient is considered an acceptable risk from a cardiac standpoint for renal transplant. Follow up in 6 months. [EKG obtained to assist in diagnosis and management of assessed problem(s)] : EKG obtained to assist in diagnosis and management of assessed problem(s)

## 2023-08-25 NOTE — CARDIOLOGY SUMMARY
[de-identified] : 08/23/23 - normal sinus rhythm, LAFB, poor R-wave progression [de-identified] : 04/20/22 (regadenoson tetrofosmin) - no evidence of infarction or inducible ischemia, LVEF 70% [de-identified] : 04/20/22 (regadenoson tetrofosmin) - no evidence of infarction or inducible ischemia, LVEF 70%

## 2023-08-25 NOTE — REVIEW OF SYSTEMS
[Lower Ext Edema] : lower extremity edema [Negative] : Musculoskeletal [SOB] : no shortness of breath [Dyspnea on exertion] : dyspnea during exertion [Chest Discomfort] : no chest discomfort [Leg Claudication] : no intermittent leg claudication [Palpitations] : no palpitations [Orthopnea] : no orthopnea [PND] : no PND [Syncope] : no syncope

## 2023-08-25 NOTE — HISTORY OF PRESENT ILLNESS
[FreeTextEntry1] : Experiences shortness of breath after walking 4 blocks. Denies chest pain or palpitations.

## 2023-08-25 NOTE — PHYSICAL EXAM
[Well Developed] : well developed [Well Nourished] : well nourished [No Acute Distress] : no acute distress [Normal Conjunctiva] : normal conjunctiva [Normal Venous Pressure] : normal venous pressure [No Carotid Bruit] : no carotid bruit [Normal S1, S2] : normal S1, S2 [No Murmur] : no murmur [No Rub] : no rub [No Gallop] : no gallop [Good Air Entry] : good air entry [No Respiratory Distress] : no respiratory distress  [Soft] : abdomen soft [Non Tender] : non-tender [Normal Gait] : normal gait [No Cyanosis] : no cyanosis [No Rash] : no rash [No Skin Lesions] : no skin lesions [Moves all extremities] : moves all extremities [No Focal Deficits] : no focal deficits [Normal Speech] : normal speech [Alert and Oriented] : alert and oriented [de-identified] : inspiratory rales in left base [de-identified] : bilateral LE 1+ pitting edema

## 2024-01-25 ENCOUNTER — NON-APPOINTMENT (OUTPATIENT)
Age: 74
End: 2024-01-25

## 2024-01-26 ENCOUNTER — APPOINTMENT (OUTPATIENT)
Dept: NEPHROLOGY | Facility: CLINIC | Age: 74
End: 2024-01-26
Payer: COMMERCIAL

## 2024-01-26 VITALS
TEMPERATURE: 96.2 F | BODY MASS INDEX: 30.31 KG/M2 | WEIGHT: 200 LBS | SYSTOLIC BLOOD PRESSURE: 131 MMHG | HEART RATE: 74 BPM | OXYGEN SATURATION: 98 % | HEIGHT: 68 IN | DIASTOLIC BLOOD PRESSURE: 86 MMHG | RESPIRATION RATE: 16 BRPM

## 2024-01-26 DIAGNOSIS — Z01.818 ENCOUNTER FOR OTHER PREPROCEDURAL EXAMINATION: ICD-10-CM

## 2024-01-26 PROCEDURE — 99215 OFFICE O/P EST HI 40 MIN: CPT

## 2024-01-26 NOTE — HISTORY OF PRESENT ILLNESS
[FreeTextEntry1] :  Pt is a 73 year old male who presents for f/u while listed for kidney transplantation.   Patient has known CKD (2000), preemptive. His nephrologist was .  He started dialysis February 2023 via AVF.  His nephrologist is KALEE Dash in [place].    He has known DM (1990) On insulin (age 49); HTN (1985). H/o Hyperlipidemia/ Gout.  He has retinopathy, no neuropathy.  No foot amputations.   H/o CAD, stent placed in Nov 2021, Dr. Greta Coello at [facility]. No chest pain or SIERRA.  He can walk 1-2 blocks.  Limited by balance.   No known h/o kidney stone. He has Prostatism on Tamsulosin. He is on Plavix. No recent hospitalizations.    Last Seen 6/2023 Listed 5/2023 Dialysis- since Feb 2023  ABO O PRA 0%  Most recent testing  Cardiac- Dr Mullen  03/31/22 - sinus bradycardia, left axis, old anteroseptal infarct 04/20/22 (regadenoson tetrofosmin) - no evidence of infarction or inducible ischemia, LVEF 70% 04/20/22 ECHO - TDS, MAC, normal LA, grossly normal LV systolic function, LVEF 65%  ASSESSMENT/PLAN: At this time, patient is considered an acceptable risk from a cardiac standpoint. Follow up annually pre-transplant.    Radiology CT Chest abd and pelvis. no contrast. 2/23/22 Scattered small pulmonary nodules largest 5 mm moderate if patient is high risk for lung cancer, follow up chest CT in 12 months can be preformed. prostatomegaly. 1.3 cm left adrenal adenoma.  CT abd pelvis and chest 7/18/2023 Lung Previously seen 5 mm nodule in the right lower lobe is not clearly visualized. There is a new 4 mm solid nodule seen in the right middle lobe, series 3:41. Stable 4 mm pleural-based nodule seen in the anterior right middle lobe, series 3:42. Stable 3 mm pulmonary nodule seen in the right lower lobe, on series 3:39. No acute pathology of the chest, abdomen or pelvis.  Bilateral renal atrophy, secondary to chronic kidney disease.  Cancer Screening PSA 5/26/23 1.72  Colonoscopy 5/10/22 internal hemorrhoids. multiple polyps. path showing both hyperplastic and tubular adenomas repeat 3 years.

## 2024-01-26 NOTE — REASON FOR VISIT
[Follow-Up] : a follow-up visit [FreeTextEntry1] : Pre kidney transplant evaluation I personally saw the patient with the Resident, and completed the key components of the history and physical exam. I then discussed the management plan with the Resident.  LONDON Cleveland-MS, MD  Internal/Emergency/Critical Care Medicine

## 2024-01-26 NOTE — PHYSICAL EXAM
[General Appearance - Alert] : alert [General Appearance - In No Acute Distress] : in no acute distress [Sclera] : the sclera and conjunctiva were normal [PERRL With Normal Accommodation] : pupils were equal in size, round, and reactive to light [Extraocular Movements] : extraocular movements were intact [Outer Ear] : the ears and nose were normal in appearance [Oropharynx] : the oropharynx was normal [Neck Appearance] : the appearance of the neck was normal [Neck Cervical Mass (___cm)] : no neck mass was observed [Jugular Venous Distention Increased] : there was no jugular-venous distention [Thyroid Nodule] : there were no palpable thyroid nodules [Auscultation Breath Sounds / Voice Sounds] : lungs were clear to auscultation bilaterally [Heart Rate And Rhythm] : heart rate was normal and rhythm regular [Heart Sounds] : normal S1 and S2 [Heart Sounds Gallop] : no gallops [Heart Sounds Pericardial Friction Rub] : no pericardial rub [Full Pulse] : the pedal pulses are present [Edema] : there was no peripheral edema [Bowel Sounds] : normal bowel sounds [Abdomen Soft] : soft [Abdomen Tenderness] : non-tender [Abdomen Mass (___ Cm)] : no abdominal mass palpated [Cervical Lymph Nodes Enlarged Posterior Bilaterally] : posterior cervical [Cervical Lymph Nodes Enlarged Anterior Bilaterally] : anterior cervical [Supraclavicular Lymph Nodes Enlarged Bilaterally] : supraclavicular [Axillary Lymph Nodes Enlarged Bilaterally] : axillary [Involuntary Movements] : no involuntary movements were seen [] : no rash [No Focal Deficits] : no focal deficits [Oriented To Time, Place, And Person] : oriented to person, place, and time [Impaired Insight] : insight and judgment were intact [Affect] : the affect was normal [FreeTextEntry1] : right DP pulse stronger than left.

## 2024-01-26 NOTE — PLAN
[FreeTextEntry1] : 1.  ESRD on dialysis - Pt is a high risk candidate for kidney transplantation given his age , his functional capacity is limited, however he is able to ambulate without any assistance devices.  Not willing to accept living donation 2.  DM2 -  check A1c 3.  Pulmonary nodules -  chest CT.   4.  CAD s/p PCI in 2021 - needs updated cardiac testing  5.  BPH on flomax.  Check PSA 6.  Imaging - update CT A/P . 7.  GI - colonoscopy up to date.   I have personally discussed the risks and benefits of transplantation and patient attended transplant education class where the following was disclosed:   Reviewed factors affecting survival and morbidity while on dialysis, the transplant wait list and reviewed doris-operative and long-term risk factors affecting outcome in kidney transplantation.     One year SRTR outcomes for national and [facility] were discussed in regards to patient survival and graft survival after transplantation.     Details of transplant surgery, including complications were discussed. Immunosuppression and complications including infection including life threatening sepsis and opportunistic infections, malignancy and new onset diabetes were discussed.     Benefits of live donor transplantation as well as variability in wait times across regions and multiple listing were discussed.  KDPI >85% and PHS high risk criteria donors were discussed.  HCV kidney transplantation was discussed.   Will proceed with completing/ updating work up and listing for transplant/ live donor transplant once work up is reviewed and found to be acceptable by multidisciplinary listing committee.

## 2024-02-07 ENCOUNTER — APPOINTMENT (OUTPATIENT)
Dept: CV DIAGNOSITCS | Facility: HOSPITAL | Age: 74
End: 2024-02-07

## 2024-03-20 ENCOUNTER — RESULT REVIEW (OUTPATIENT)
Age: 74
End: 2024-03-20

## 2024-03-20 ENCOUNTER — APPOINTMENT (OUTPATIENT)
Dept: CV DIAGNOSTICS | Facility: HOSPITAL | Age: 74
End: 2024-03-20

## 2024-03-20 ENCOUNTER — APPOINTMENT (OUTPATIENT)
Dept: CV DIAGNOSITCS | Facility: HOSPITAL | Age: 74
End: 2024-03-20

## 2024-03-20 ENCOUNTER — OUTPATIENT (OUTPATIENT)
Dept: OUTPATIENT SERVICES | Facility: HOSPITAL | Age: 74
LOS: 1 days | End: 2024-03-20
Payer: MEDICARE

## 2024-03-20 DIAGNOSIS — Z01.818 ENCOUNTER FOR OTHER PREPROCEDURAL EXAMINATION: ICD-10-CM

## 2024-03-20 DIAGNOSIS — Z98.890 OTHER SPECIFIED POSTPROCEDURAL STATES: Chronic | ICD-10-CM

## 2024-03-20 DIAGNOSIS — Z98.61 CORONARY ANGIOPLASTY STATUS: Chronic | ICD-10-CM

## 2024-03-20 PROCEDURE — 93018 CV STRESS TEST I&R ONLY: CPT | Mod: GC,MC

## 2024-03-20 PROCEDURE — 78451 HT MUSCLE IMAGE SPECT SING: CPT | Mod: 26,MC

## 2024-03-20 PROCEDURE — 93016 CV STRESS TEST SUPVJ ONLY: CPT | Mod: GC,MC

## 2024-03-20 PROCEDURE — 93306 TTE W/DOPPLER COMPLETE: CPT | Mod: 26

## 2024-03-27 ENCOUNTER — APPOINTMENT (OUTPATIENT)
Dept: CARDIOLOGY | Facility: CLINIC | Age: 74
End: 2024-03-27
Payer: MEDICARE

## 2024-03-27 ENCOUNTER — NON-APPOINTMENT (OUTPATIENT)
Age: 74
End: 2024-03-27

## 2024-03-27 VITALS
WEIGHT: 200 LBS | TEMPERATURE: 97 F | SYSTOLIC BLOOD PRESSURE: 134 MMHG | DIASTOLIC BLOOD PRESSURE: 77 MMHG | BODY MASS INDEX: 30.31 KG/M2 | HEART RATE: 74 BPM | HEIGHT: 68 IN | OXYGEN SATURATION: 96 %

## 2024-03-27 DIAGNOSIS — I10 ESSENTIAL (PRIMARY) HYPERTENSION: ICD-10-CM

## 2024-03-27 DIAGNOSIS — I25.10 ATHEROSCLEROTIC HEART DISEASE OF NATIVE CORONARY ARTERY W/OUT ANGINA PECTORIS: ICD-10-CM

## 2024-03-27 PROCEDURE — 93000 ELECTROCARDIOGRAM COMPLETE: CPT

## 2024-03-27 PROCEDURE — 99214 OFFICE O/P EST MOD 30 MIN: CPT | Mod: 25

## 2024-03-27 RX ORDER — LEVOTHYROXINE SODIUM 0.07 MG/1
75 TABLET ORAL
Qty: 90 | Refills: 0 | Status: DISCONTINUED | COMMUNITY
Start: 2022-05-25 | End: 2024-03-27

## 2024-03-27 RX ORDER — LEVOTHYROXINE SODIUM 25 UG/1
25 TABLET ORAL DAILY
Refills: 0 | Status: DISCONTINUED | COMMUNITY
End: 2024-03-27

## 2024-03-27 RX ORDER — LEVOTHYROXINE SODIUM 0.05 MG/1
50 TABLET ORAL
Qty: 30 | Refills: 0 | Status: DISCONTINUED | COMMUNITY
Start: 2022-05-17 | End: 2024-03-27

## 2024-03-27 NOTE — REVIEW OF SYSTEMS
[Dyspnea on exertion] : dyspnea during exertion [Lower Ext Edema] : lower extremity edema [Negative] : Musculoskeletal

## 2024-03-27 NOTE — DISCUSSION/SUMMARY
[Coronary Artery Disease] : coronary artery disease [Stable] : stable [Hypertension] : hypertension [Low Sodium Diet] : low sodium diet

## 2024-03-27 NOTE — PHYSICAL EXAM
[Well Nourished] : well nourished [Well Developed] : well developed [Normal Conjunctiva] : normal conjunctiva [No Acute Distress] : no acute distress [No Carotid Bruit] : no carotid bruit [Normal Venous Pressure] : normal venous pressure [Normal S1, S2] : normal S1, S2 [No Murmur] : no murmur [No Rub] : no rub [No Gallop] : no gallop [Good Air Entry] : good air entry [Soft] : abdomen soft [No Respiratory Distress] : no respiratory distress  [Normal Gait] : normal gait [Non Tender] : non-tender [No Cyanosis] : no cyanosis [No Rash] : no rash [Moves all extremities] : moves all extremities [No Skin Lesions] : no skin lesions [No Focal Deficits] : no focal deficits [Normal Speech] : normal speech [Alert and Oriented] : alert and oriented

## 2024-08-09 ENCOUNTER — NON-APPOINTMENT (OUTPATIENT)
Age: 74
End: 2024-08-09

## 2024-08-09 ENCOUNTER — APPOINTMENT (OUTPATIENT)
Dept: NEPHROLOGY | Facility: CLINIC | Age: 74
End: 2024-08-09

## 2024-08-09 PROCEDURE — 99205 OFFICE O/P NEW HI 60 MIN: CPT

## 2024-08-09 NOTE — HISTORY OF PRESENT ILLNESS
[FreeTextEntry1] : 74 year old male who presents for f/u while listed for kidney transplantation.   Patient has known CKD (2000), .  He started dialysis February 2023 via AVF.  His nephrologist is KALEE Dash in [place].    He has known DM (1990) On insulin (age 49); HTN (1985). H/o Hyperlipidemia/ Gout.  He has retinopathy, no neuropathy.  No foot amputations.   H/o CAD, stent placed in Nov 2021, Dr. Greta Coello at [facility]. No chest pain or SIERRA.  He can walk 1-2 blocks.  Limited by balance.   No known h/o kidney stone. He has Prostatism on Tamsulosin. He is on Plavix. No recent hospitalizations.    Last Seen 6/2023 Listed 5/2023 Dialysis- since Feb 2023  ABO O PRA 0%  Most recent testing  Cardiac- Dr Mullen  03/31/22 - sinus bradycardia, left axis, old anteroseptal infarct 04/20/22 (regadenoson tetrofosmin) - no evidence of infarction or inducible ischemia, LVEF 70% 04/20/22 ECHO - TDS, MAC, normal LA, grossly normal LV systolic function, LVEF 65%  ASSESSMENT/PLAN: At this time, patient is considered an acceptable risk from a cardiac standpoint. Follow up annually pre-transplant.    Radiology CT Chest abd and pelvis. no contrast. 2/23/22 Scattered small pulmonary nodules largest 5 mm moderate if patient is high risk for lung cancer, follow up chest CT in 12 months can be preformed. prostatomegaly. 1.3 cm left adrenal adenoma.  CT abd pelvis and chest 7/18/2023 Lung Previously seen 5 mm nodule in the right lower lobe is not clearly visualized. There is a new 4 mm solid nodule seen in the right middle lobe, series 3:41. Stable 4 mm pleural-based nodule seen in the anterior right middle lobe, series 3:42. Stable 3 mm pulmonary nodule seen in the right lower lobe, on series 3:39. No acute pathology of the chest, abdomen or pelvis.  Bilateral renal atrophy, secondary to chronic kidney disease.  Cancer Screening PSA 5/26/23 1.72  Colonoscopy 5/10/22 internal hemorrhoids. multiple polyps. path showing both hyperplastic and tubular adenomas repeat 3 years.

## 2024-08-09 NOTE — QUALITY MEASURES
[Patient has started or completed the] : patient has started or completed the process of evaluation for renal transplant [Yes] : patient is an appropriate candidate for kidney transplantation evaluation

## 2024-08-09 NOTE — PLAN
[FreeTextEntry1] : High risk candidate for kidney transplantation given his age , his functional capacity is limited, however he is able to ambulate without any assistance devices.  Not willing to accept living donation will do fraility testing today   Follows with KALEE Dash in [place]  Last Seen 1/2024 Listed 5/20/2022 Dialysis 2/14/2023 ABO O will check titer today PRA 0% will repeat today  Most recent testing Cardiac - Dr. Mullen ECHO 03/20/24 - MAC, normal LA, mild diastolic dysfunction, normal RV size and function, LVEF 66%, Ao root 3.6 cm Stress 03/20/24 (regadenoson tetrofosmin) - no evidence of infarction or inducible ischemia, LVEF 65%   Radiology CT Chest 7/10/2023- Patent central airways. Previously seen 5 mm nodule in the right lower lobe is not clearly visualized. There is a new 4 mm solid nodule seen in the right middle lobe. Stable 4 mm pleural-based nodule seen in the anterior right middle lobe. Stable 3 mm pulmonary nodule seen in the right lower lobe,  CT abd and pelvis 7/10/2023- No acute pathology of the chest, abdomen or pelvis. Bilateral renal atrophy, secondary to chronic kidney disease.   Cancer Screening PSA- 5/2023- 1.72  Colonoscopy 5/2022- multiple polyps revealing hyperplastic and tubular adenomas; repeat in 3 years (5/2025)   I have personally discussed the risks and benefits of transplantation and patient attended transplant education class where the following was disclosed:   Reviewed factors affecting survival and morbidity while on dialysis, the transplant wait list and reviewed doris-operative and long-term risk factors affecting outcome in kidney transplantation.     One year SRTR outcomes for national and [facility] were discussed in regards to patient survival and graft survival after transplantation.     Details of transplant surgery, including complications were discussed. Immunosuppression and complications including infection including life threatening sepsis and opportunistic infections, malignancy and new onset diabetes were discussed.     Benefits of live donor transplantation as well as variability in wait times across regions and multiple listing were discussed.  KDPI >85% and PHS high risk criteria donors were discussed.  HCV kidney transplantation was discussed.   Will proceed with completing/ updating work up and listing for transplant/ live donor transplant once work up is reviewed and found to be acceptable by multidisciplinary listing committee.

## 2025-02-14 ENCOUNTER — NON-APPOINTMENT (OUTPATIENT)
Age: 75
End: 2025-02-14

## 2025-02-14 ENCOUNTER — APPOINTMENT (OUTPATIENT)
Dept: NEPHROLOGY | Facility: CLINIC | Age: 75
End: 2025-02-14

## 2025-03-05 ENCOUNTER — NON-APPOINTMENT (OUTPATIENT)
Age: 75
End: 2025-03-05

## 2025-03-05 ENCOUNTER — APPOINTMENT (OUTPATIENT)
Dept: CARDIOLOGY | Facility: CLINIC | Age: 75
End: 2025-03-05
Payer: COMMERCIAL

## 2025-03-05 VITALS
HEIGHT: 68 IN | SYSTOLIC BLOOD PRESSURE: 153 MMHG | OXYGEN SATURATION: 97 % | TEMPERATURE: 98 F | WEIGHT: 198.4 LBS | HEART RATE: 72 BPM | BODY MASS INDEX: 30.07 KG/M2 | DIASTOLIC BLOOD PRESSURE: 75 MMHG

## 2025-03-05 DIAGNOSIS — I10 ESSENTIAL (PRIMARY) HYPERTENSION: ICD-10-CM

## 2025-03-05 DIAGNOSIS — I25.10 ATHEROSCLEROTIC HEART DISEASE OF NATIVE CORONARY ARTERY W/OUT ANGINA PECTORIS: ICD-10-CM

## 2025-03-05 PROCEDURE — G2211 COMPLEX E/M VISIT ADD ON: CPT

## 2025-03-05 PROCEDURE — 93000 ELECTROCARDIOGRAM COMPLETE: CPT

## 2025-03-05 PROCEDURE — 99214 OFFICE O/P EST MOD 30 MIN: CPT

## 2025-06-25 NOTE — H&P PST ADULT - GASTROINTESTINAL DETAILS
[FreeTextEntry1] : Reginaldo is an 17 year old male with non erosive reflux.  Lifestyle factors in diet and EtOH use are confounding variables.  Discussed trying zyrtec for 1-2 weeks to see if improves post nasal drip symptoms.  If no improvement, can try omeprazole.  
soft/nontender/bowel sounds normal/no guarding/no rigidity

## (undated) DEVICE — DRSG STERISTRIPS 0.5 X 4"

## (undated) DEVICE — PACK AV FISTULA

## (undated) DEVICE — GLV 7 PROTEXIS (WHITE)

## (undated) DEVICE — SUT PROLENE 6-0 4-30" BV-1

## (undated) DEVICE — PREP CHLORAPREP HI-LITE ORANGE 26ML

## (undated) DEVICE — DRAPE INSTRUMENT POUCH 6.75" X 11"

## (undated) DEVICE — GLV 7.5 PROTEXIS (WHITE)

## (undated) DEVICE — CLAMP BULLDOG MIDI STRAIGHT (YELLOW) DISP

## (undated) DEVICE — GOWN XL

## (undated) DEVICE — BLADE SCALPEL SAFETYLOCK #11

## (undated) DEVICE — SOL INJ NS 0.9% 500ML 1-PORT

## (undated) DEVICE — BLADE SCALPEL SAFETYLOCK #10

## (undated) DEVICE — SUT POLYSORB 3-0 30" V-20 UNDYED

## (undated) DEVICE — WARMING BLANKET LOWER ADULT

## (undated) DEVICE — SUT PROLENE 6-0 4-30" C-1

## (undated) DEVICE — GLV 8 PROTEXIS (WHITE)

## (undated) DEVICE — DRAPE TOWEL BLUE 17" X 24"

## (undated) DEVICE — GOWN TRIMAX LG

## (undated) DEVICE — SUT PROLENE 7-0 24" BV-1

## (undated) DEVICE — VESSEL LOOP MAXI-BLUE 0.120" X 16"

## (undated) DEVICE — BLADE SCALPEL SAFETYLOCK #15

## (undated) DEVICE — SUCTION YANKAUER NO CONTROL VENT

## (undated) DEVICE — VENODYNE/SCD SLEEVE CALF LARGE

## (undated) DEVICE — MEDICATION LABELS W MARKER

## (undated) DEVICE — DRSG TEGADERM 6"X8"

## (undated) DEVICE — SOL IRR POUR H2O 250ML

## (undated) DEVICE — SUT BIOSYN 4-0 18" P-12

## (undated) DEVICE — SUT SOFSILK 4-0 18" TIES

## (undated) DEVICE — SUT SILK 3-0 18" TIES

## (undated) DEVICE — SPECIMEN CONTAINER 100ML

## (undated) DEVICE — DISSECTOR ENDO PEANUT 5MM

## (undated) DEVICE — DRAPE LIGHT HANDLE COVER (BLUE)

## (undated) DEVICE — GLV 6.5 PROTEXIS (WHITE)

## (undated) DEVICE — STAPLER SKIN VISI-STAT 35 WIDE

## (undated) DEVICE — SUT SOFSILK 2-0 18" TIES

## (undated) DEVICE — VESSEL LOOP MINI-BLUE 0.075" X 16"

## (undated) DEVICE — DRSG OPSITE 13.75 X 4"